# Patient Record
Sex: FEMALE | Race: WHITE | NOT HISPANIC OR LATINO | Employment: FULL TIME | URBAN - METROPOLITAN AREA
[De-identification: names, ages, dates, MRNs, and addresses within clinical notes are randomized per-mention and may not be internally consistent; named-entity substitution may affect disease eponyms.]

---

## 2017-01-16 ENCOUNTER — TELEPHONE (OUTPATIENT)
Dept: DERMATOLOGY | Facility: CLINIC | Age: 27
End: 2017-01-16

## 2017-01-16 NOTE — TELEPHONE ENCOUNTER
This CMA left message for pt to call clinic back @ [phone], to move up derm appt with dr. Crenshaw. Patient was on our waitlist.     Amorrmalia Clemente CMA

## 2017-01-30 ENCOUNTER — DOCUMENTATION ONLY (OUTPATIENT)
Dept: DERMATOLOGY | Facility: CLINIC | Age: 27
End: 2017-01-30

## 2017-01-30 NOTE — PROGRESS NOTES
Writer attempted to contact patient for previsit call to which phone went right to voicemail and mailbox is full so unable to leave a message.    Scarlet Cole LPN

## 2017-03-01 ENCOUNTER — OFFICE VISIT (OUTPATIENT)
Dept: DERMATOLOGY | Facility: CLINIC | Age: 27
End: 2017-03-01
Payer: COMMERCIAL

## 2017-03-01 DIAGNOSIS — L50.9 HIVES: ICD-10-CM

## 2017-03-01 DIAGNOSIS — L30.1 DYSHIDROTIC DERMATITIS: Primary | ICD-10-CM

## 2017-03-01 PROCEDURE — 99201 ZZC OFFICE/OUTPT VISIT, NEW, LEVEL I: CPT | Performed by: DERMATOLOGY

## 2017-03-01 ASSESSMENT — PAIN SCALES - GENERAL: PAINLEVEL: NO PAIN (0)

## 2017-03-01 NOTE — PATIENT INSTRUCTIONS
Apply triamcinolone cream 0.1% twice a day followed immediately with a thick moisturizer like Vanicream. If you do it once before bed, you can put on cotton gloves to go to sleep which will help drive the medicine in better. Please use a moisturizer liberally and minimize contact with irritating things like harsh soaps, antiseptic foam, dish detergent.    For hives: take loratadine 10mg up to 3 times a day for prevention if it occurs. If it doesn't, don't bother. Zyrtec (cetirizine) is metabolised to hydroxyzine in the body so can be sedating for some people.

## 2017-03-01 NOTE — PROGRESS NOTES
[facility] Dermatology Note      Dermatology Problem List:  1. Hives (possibly triggered by flare of dyshidrotic dermatitis_  -s/p Loratadine 10 mg up to 3 times a day, Zyrtec prn   2. Dyshidrotic dermatitis   -s/p Triamcinolone 0.1% cream BID + vanicream liberally + cotton gloves at night.    Encounter Date: Mar 1, 2017    CC:  Chief Complaint   Patient presents with     Derm Problem     rash on wrist and arms          History of Present Illness:  Ms. Ursula De La Cruz is a 26 year old female who presents for evaluation of rash. Pt was seen by Dr. Sharp 11/26/2012 for acne vulgaris. Today, the pt reports rash started in December 2016 and she saw her PCP who started her on oral antibiotics. Rash temporarily resolved but came back. The second time the rash developed, pt saw her PCP who started her on Kenalog 0.1% cream with no improvement to rash. However, pt isn't really that consistent with medication application. Rash is pruritic and appears as tiny blistering bumps on the hands that then extended out to her arms. Pt said the rash is not so bad right now but she has pictures from when it was worse. She also noted hives when the rash was at its worse and showed pictures of the hives. The hives resolved within 24 hours and hasn't returned. It is on her hands and at times legs. Pt states she has tried switched detergents and changed moisturizers with no improvement. Pt denies a personal and family hx of asthma, dry skin rash, but admits to seasonal allergies. Pt currently uses Vanicream as moisturizer. She was on vacation and felt that the rash was stable or perhaps even got worse.    Pt has a history of seasonal allergies and takes zyrtec 10mg daily.    Past Medical History:   Patient Active Problem List   Diagnosis     CARDIOVASCULAR SCREENING; LDL GOAL LESS THAN 160     Adjustment reaction     Acne     KP (keratosis pilaris)     History of compression fracture of spine     ADD (attention  deficit disorder)     Family history of polycystic kidney disease     Goiter     Overweight (BMI 25.0-29.9)     Family history of thyroid disease     Aneurysm of cavernous portion of left internal carotid artery, 2.6mm     Hashimoto's thyroiditis     PVC's (premature ventricular contractions)     Past Medical History   Diagnosis Date     Acne vulgaris      Coronary artery disease      PVCs-asymptomatic. not on any meds for this     Goiter, unspecified 3/17/2015     Hashimoto's thyroiditis 12/21/2016     Past Surgical History   Procedure Laterality Date     Breast surgery  2014     elective augmentation     Cosmetic surgery       breast augmentation     Back surgery  2007     L1 compression fracture, no surgery       Social History:  The patient works as a RN in pediatrics. The patient admits to use of tanning beds. She admits to consumption of 1-2 alcoholic beverages and is a nonsmoker.     Family History:  There is no family history of skin cancer. There is a family hx of Autoimmune disease (Hashimotos) and Hereditary disease (Polycystic kidney disease).     Medications:  Current Outpatient Prescriptions   Medication Sig Dispense Refill     NATURE-THROID 32.5 MG TABS        cetirizine (ZYRTEC) 10 MG tablet Take 10 mg by mouth At Bedtime       triamcinolone (KENALOG) 0.1 % cream Apply sparingly to affected area three times daily until rash resolves. 453.6 g 5     polyethylene glycol (MIRALAX) powder 2-3 servings a day for 3-4 days, then change to 1 serving daily. 510 g 5     levonorgestrel-ethinyl estradiol (LIZ-28) 0.15-30 MG-MCG per tablet Take 1 tablet daily continuously for irregular bleeding 112 tablet 4       No Known Allergies    Review of Systems:  -Skin/Heme New Pt: The patient denies frequent sun exposure. The patient denies excessive scarring or problems healing except as per HPI. The patient denies excessive bleeding.  -Constitutional: The patient is otherwise feeling well.     Physical exam:  Vitals:  There were no vitals taken for this visit.  GEN: This is a well-nourished, well developed female in no acute distress  NEURO: Alert and oriented  PSYCH: in a pleasant mood, appropriate affect  SKIN: Focused examination of the b/l hands, arms was performed.  -Scattered pink eczematous papule on the dorsal hand, dorsal fingers bilaterally   -No dermatographism. No obvious hives.  -No other lesions of concern on areas examined.       Impression/Plan:  1. Hives (as seen in patient's photograph she provided): possibly 2/2 to having a lot of inflammation in the skin or other reasons. No need for treatment if it doesn't return. If it does, can use loratadine 10mg up to 3x a day. Given that pt gets very tired with hydroxyzine, cetirizine probably isn't the best idea as it is converted to hydroxyzine in the body.  2. Dyshidrotic dermatitis of the hands with extension to the arms.     Continue Triamcinolone 0.1% cream BID everyday with thick moisturizer such as Vanicream with cotton gloves at night until follow up. As rash improves, decrease triamcinolone use but continue to moisturize.     Minimize use of foams, wash hands instead if possible, frequent use of moisturizers.      Follow-up in 1 month, earlier for new or changing lesions.       Staff Involved:  Scribe/Staff      Scribe Disclosure:   I, Cathleen Javier, am serving as a scribe to document services personally performed by Dr. Subhash Crenshaw, based on data collection and the provider's statements to me.     Provider Disclosure:   I have reviewed the documentation recorded by the scribe and have edited it as needed. I have personally performed the services documented here and the documentation accurately represents those services and the decisions made by me.     Subhash Crenshaw MD, MS    Department of Dermatology  [facility]: Phone: [phone], Fax:[phone]  [facility]: Phone: [phone], Fax: [phone]

## 2017-03-01 NOTE — MR AVS SNAPSHOT
After Visit Summary   3/1/2017    Ursula De La Cruz    MRN: [id number]           Patient Information     Date Of Birth          1990        Visit Information        Provider Department      3/1/2017 1:30 PM Subhash Crenshaw MD [facility]        Today's Diagnoses     Dyshidrotic dermatitis    -  1    Hives          Care Instructions    Apply triamcinolone cream 0.1% twice a day followed immediately with a thick moisturizer like Vanicream. If you do it once before bed, you can put on cotton gloves to go to sleep which will help drive the medicine in better. Please use a moisturizer liberally and minimize contact with irritating things like harsh soaps, antiseptic foam, dish detergent.    For hives: take loratadine 10mg up to 3 times a day for prevention if it occurs. If it doesn't, don't bother. Zyrtec (cetirizine) is metabolised to hydroxyzine in the body so can be sedating for some people.        Follow-ups after your visit        Your next 10 appointments already scheduled     May 03, 2017  9:45 AM CDT   Return Visit with Subhash Crenshaw MD   [facility]    [address] [place] [id number] [phone]              Who to contact     If you have questions or need follow up information about today's clinic visit or your schedule please contact [facility] directly at [phone].  Normal or non-critical lab and imaging results will be communicated to you by MyChart, letter or phone within 4 business days after the clinic has received the results. If you do not hear from us within 7 days, please contact the clinic through MyChart or phone. If you have a critical or abnormal lab result, we will notify you by phone as soon as possible.  Submit refill requests through [employer] or call your pharmacy and they will forward the refill request to us. Please allow 3 business days for your refill to be  completed.          Additional Information About Your Visit        [employer] Information     [employer] gives you secure access to your electronic health record. If you see a primary care provider, you can also send messages to your care team and make appointments. If you have questions, please call your primary care clinic.  If you do not have a primary care provider, please call [phone] and they will assist you.      [employer] is an electronic gateway that provides easy, online access to your medical records. With [employer], you can request a clinic appointment, read your test results, renew a prescription or communicate with your care team.     To access your existing account, please contact your [facility] or call [phone] for assistance.        Care EveryWhere ID     This is your Care EveryWhere ID. This could be used by other organizations to access your [facility] medical records  [id number]         Blood Pressure from Last 3 Encounters:   11/09/16 110/72   07/18/16 134/88   02/12/16 127/70    Weight from Last 3 Encounters:   12/21/16 156 lb (70.8 kg)   11/09/16 160 lb (72.6 kg)   07/18/16 162 lb 6.4 oz (73.7 kg)              Today, you had the following     No orders found for display         Today's Medication Changes          These changes are accurate as of: 3/1/17  1:51 PM.  If you have any questions, ask your nurse or doctor.               Stop taking these medicines if you haven't already. Please contact your care team if you have questions.     hydrOXYzine 25 MG tablet   Commonly known as:  ATARAX           methylPREDNISolone 4 MG tablet   Commonly known as:  MEDROL DOSEPAK                    Primary Care Provider Office Phone # Fax #    Toni Scott [phone]       [facility] [address]  [facility] [address]        Thank you!     Thank you for choosing [facility]  for your care. Our goal is always to provide  you with excellent care. Hearing back from our patients is one way we can continue to improve our services. Please take a few minutes to complete the written survey that you may receive in the mail after your visit with us. Thank you!             Your Updated Medication List - Protect others around you: Learn how to safely use, store and throw away your medicines at www.disposemymeds.org.          This list is accurate as of: 3/1/17  1:51 PM.  Always use your most recent med list.                   Brand Name Dispense Instructions for use    cetirizine 10 MG tablet    zyrTEC     Take 10 mg by mouth At Bedtime       levonorgestrel-ethinyl estradiol 0.15-30 MG-MCG per tablet    LIZ-28    112 tablet    Take 1 tablet daily continuously for irregular bleeding       NATURE-THROID 32.5 MG Tabs   Generic drug:  thyroid          polyethylene glycol powder    MIRALAX    510 g    2-3 servings a day for 3-4 days, then change to 1 serving daily.       triamcinolone 0.1 % cream    KENALOG    453.6 g    Apply sparingly to affected area three times daily until rash resolves.

## 2017-03-01 NOTE — LETTER
3/1/2017       RE: Ursula De La Cruz  [address] [place] [address]     Dear Colleague,    Thank you for referring your patient, Ursula De La Cruz, to the [facility]. Please see a copy of my visit note below.    [facility] Dermatology Note      Dermatology Problem List:  1. Hives (possibly triggered by flare of dyshidrotic dermatitis_  -s/p Loratadine 10 mg up to 3 times a day, Zyrtec prn   2. Dyshidrotic dermatitis   -s/p Triamcinolone 0.1% cream BID + vanicream liberally + cotton gloves at night.    Encounter Date: Mar 1, 2017    CC:  Chief Complaint   Patient presents with     Derm Problem     rash on wrist and arms          History of Present Illness:  Ms. Ursula De La Cruz is a 26 year old female who presents for evaluation of rash. Pt was seen by Dr. Sharp 11/26/2012 for acne vulgaris. Today, the pt reports rash started in December 2016 and she saw her PCP who started her on oral antibiotics. Rash temporarily resolved but came back. The second time the rash developed, pt saw her PCP who started her on Kenalog 0.1% cream with no improvement to rash. However, pt isn't really that consistent with medication application. Rash is pruritic and appears as tiny blistering bumps on the hands that then extended out to her arms. Pt said the rash is not so bad right now but she has pictures from when it was worse. She also noted hives when the rash was at its worse and showed pictures of the hives. The hives resolved within 24 hours and hasn't returned. It is on her hands and at times legs. Pt states she has tried switched detergents and changed moisturizers with no improvement. Pt denies a personal and family hx of asthma, dry skin rash, but admits to seasonal allergies. Pt currently uses Vanicream as moisturizer. She was on vacation and felt that the rash was stable or perhaps even got worse.    Pt has a history of  seasonal allergies and takes zyrtec 10mg daily.    Past Medical History:   Patient Active Problem List   Diagnosis     CARDIOVASCULAR SCREENING; LDL GOAL LESS THAN 160     Adjustment reaction     Acne     KP (keratosis pilaris)     History of compression fracture of spine     ADD (attention deficit disorder)     Family history of polycystic kidney disease     Goiter     Overweight (BMI 25.0-29.9)     Family history of thyroid disease     Aneurysm of cavernous portion of left internal carotid artery, 2.6mm     Hashimoto's thyroiditis     PVC's (premature ventricular contractions)     Past Medical History   Diagnosis Date     Acne vulgaris      Coronary artery disease      PVCs-asymptomatic. not on any meds for this     Goiter, unspecified 3/17/2015     Hashimoto's thyroiditis 12/21/2016     Past Surgical History   Procedure Laterality Date     Breast surgery  2014     elective augmentation     Cosmetic surgery       breast augmentation     Back surgery  2007     L1 compression fracture, no surgery       Social History:  The patient works as a RN in pediatrics. The patient admits to use of tanning beds. She admits to consumption of 1-2 alcoholic beverages and is a nonsmoker.     Family History:  There is no family history of skin cancer. There is a family hx of Autoimmune disease (Hashimotos) and Hereditary disease (Polycystic kidney disease).     Medications:  Current Outpatient Prescriptions   Medication Sig Dispense Refill     NATURE-THROID 32.5 MG TABS        cetirizine (ZYRTEC) 10 MG tablet Take 10 mg by mouth At Bedtime       triamcinolone (KENALOG) 0.1 % cream Apply sparingly to affected area three times daily until rash resolves. 453.6 g 5     polyethylene glycol (MIRALAX) powder 2-3 servings a day for 3-4 days, then change to 1 serving daily. 510 g 5     levonorgestrel-ethinyl estradiol (LIZ-28) 0.15-30 MG-MCG per tablet Take 1 tablet daily continuously for irregular bleeding 112 tablet 4       No Known  Allergies    Review of Systems:  -Skin/Heme New Pt: The patient denies frequent sun exposure. The patient denies excessive scarring or problems healing except as per HPI. The patient denies excessive bleeding.  -Constitutional: The patient is otherwise feeling well.     Physical exam:  Vitals: There were no vitals taken for this visit.  GEN: This is a well-nourished, well developed female in no acute distress  NEURO: Alert and oriented  PSYCH: in a pleasant mood, appropriate affect  SKIN: Focused examination of the b/l hands, arms was performed.  -Scattered pink eczematous papule on the dorsal hand, dorsal fingers bilaterally   -No dermatographism. No obvious hives.  -No other lesions of concern on areas examined.       Impression/Plan:  1. Hives (as seen in patient's photograph she provided): possibly 2/2 to having a lot of inflammation in the skin or other reasons. No need for treatment if it doesn't return. If it does, can use loratadine 10mg up to 3x a day. Given that pt gets very tired with hydroxyzine, cetirizine probably isn't the best idea as it is converted to hydroxyzine in the body.  2. Dyshidrotic dermatitis of the hands with extension to the arms.     Continue Triamcinolone 0.1% cream BID everyday with thick moisturizer such as Vanicream with cotton gloves at night until follow up. As rash improves, decrease triamcinolone use but continue to moisturize.     Minimize use of foams, wash hands instead if possible, frequent use of moisturizers.      Follow-up in 1 month, earlier for new or changing lesions.       Staff Involved:  Scribe/Staff      Scribe Disclosure:   I, Cathleen Javier, am serving as a scribe to document services personally performed by Dr. Subhash Crenshaw, based on data collection and the provider's statements to me.     Provider Disclosure:   I have reviewed the documentation recorded by the scribe and have edited it as needed. I have personally performed the services documented here and the  documentation accurately represents those services and the decisions made by me.     Subhash Crenshaw MD, MS    Department of Dermatology  [facility]: Phone: [phone], Fax:[phone]  [facility]: Phone: [phone], Fax: [phone]

## 2017-07-18 ENCOUNTER — TELEPHONE (OUTPATIENT)
Dept: CARDIOLOGY | Facility: CLINIC | Age: 27
End: 2017-07-18

## 2017-07-18 DIAGNOSIS — I49.3 PVC'S (PREMATURE VENTRICULAR CONTRACTIONS): Primary | ICD-10-CM

## 2017-07-18 NOTE — TELEPHONE ENCOUNTER
Reason for Call:  Other call back    Detailed comments:  Patient calling. She is to have a echo done yearly. Please place order and call patient when done.     Phone Number Patient can be reached at: Home number on file [phone] (home)    Best Time: any     Can we leave a detailed message on this number? YES    Call taken on 7/18/2017 at 11:52 AM by Maddison Palmer

## 2017-07-18 NOTE — TELEPHONE ENCOUNTER
Called and spoke to Patient, explained to her that In Dr. Candido Narvaez note from 7/2016 it does not state that he wants her to have a repeat echo, just yearly follow up.  Patient states that she was told by Dr. Candido Narvaez that she needed yearly echos as well.    Patient does have follow up appointment scheduled at 10/16/2017at 1:40.  She is requesting to have her echo done the same day before she sees Dr. Candido Narvaez.    Message sent to provider to advise if he wants another echo ordered or not for her yearly follow up appointment.    Roma Crabtree CMA.

## 2017-07-31 NOTE — TELEPHONE ENCOUNTER
Per Dr. Narvaez, patient can have an echo prior to visit.  Echo ordered.  Please call patient to schedule.  Juliet Navarrete RN

## 2017-08-01 NOTE — TELEPHONE ENCOUNTER
Called and spoke to Patient, let her know echo order has been placed. Patient states she will call imaging dept to make appointment for echo prior to OV 10/16/17, confirmed she has the phone number [phone].    Roma Crabtree CMA.

## 2017-09-13 DIAGNOSIS — Z36.0 SCREENING FOR CHROMOSOMAL ANOMALIES BY AMNIOCENTESIS: Primary | ICD-10-CM

## 2017-09-13 PROCEDURE — 86762 RUBELLA ANTIBODY: CPT | Performed by: OBSTETRICS & GYNECOLOGY

## 2017-09-13 PROCEDURE — 86747 PARVOVIRUS ANTIBODY: CPT | Mod: 90 | Performed by: OBSTETRICS & GYNECOLOGY

## 2017-09-13 PROCEDURE — 86644 CMV ANTIBODY: CPT | Performed by: OBSTETRICS & GYNECOLOGY

## 2017-09-13 PROCEDURE — 99000 SPECIMEN HANDLING OFFICE-LAB: CPT | Performed by: OBSTETRICS & GYNECOLOGY

## 2017-09-13 PROCEDURE — 36415 COLL VENOUS BLD VENIPUNCTURE: CPT | Performed by: OBSTETRICS & GYNECOLOGY

## 2017-09-14 LAB
B19V IGG SER IA-ACNC: 0.3 IV
B19V IGM SER IA-ACNC: 0.15 IV
CMV IGG SERPL QL IA: 0.3 AI (ref 0–0.8)
RUBV IGG SERPL IA-ACNC: 5 IU/ML

## 2017-09-27 ENCOUNTER — RADIANT APPOINTMENT (OUTPATIENT)
Dept: CARDIOLOGY | Facility: CLINIC | Age: 27
End: 2017-09-27
Attending: INTERNAL MEDICINE
Payer: COMMERCIAL

## 2017-09-27 DIAGNOSIS — I49.3 PVC'S (PREMATURE VENTRICULAR CONTRACTIONS): ICD-10-CM

## 2017-09-27 PROCEDURE — 93306 TTE W/DOPPLER COMPLETE: CPT

## 2017-10-09 ENCOUNTER — PRE VISIT (OUTPATIENT)
Dept: CARDIOLOGY | Facility: CLINIC | Age: 27
End: 2017-10-09

## 2017-10-16 ENCOUNTER — OFFICE VISIT (OUTPATIENT)
Dept: CARDIOLOGY | Facility: CLINIC | Age: 27
End: 2017-10-16
Payer: COMMERCIAL

## 2017-10-16 VITALS — DIASTOLIC BLOOD PRESSURE: 84 MMHG | OXYGEN SATURATION: 100 % | SYSTOLIC BLOOD PRESSURE: 131 MMHG | HEART RATE: 77 BPM

## 2017-10-16 DIAGNOSIS — I49.3 PVC'S (PREMATURE VENTRICULAR CONTRACTIONS): Primary | ICD-10-CM

## 2017-10-16 PROCEDURE — 99213 OFFICE O/P EST LOW 20 MIN: CPT | Performed by: INTERNAL MEDICINE

## 2017-10-16 ASSESSMENT — PAIN SCALES - GENERAL: PAINLEVEL: NO PAIN (0)

## 2017-10-16 NOTE — TELEPHONE ENCOUNTER
15 month follow up with Dr. Candido Narvaez for PVCs that correlated with symptoms of palpitations. ECHO done 9/27/2017.

## 2017-10-16 NOTE — MR AVS SNAPSHOT
After Visit Summary   10/16/2017    Ursula De La Cruz    MRN: [id number]           Patient Information     Date Of Birth          1990        Visit Information        Provider Department      10/16/2017 1:40 PM Candido Narvaez MD [facility]        Today's Diagnoses     PVC's (premature ventricular contractions)    -  1      Care Instructions    1.          [facility] Cardiology [facility] Location    If you have any questions regarding to your visit please contact your care team:     Cardiology  Telephone Number   JulietAbraham Diaz  Cardiology RN's.    Kathy Crabtree CMA [phone]    After hours: [phone].  [phone]   For scheduling appts:     [phone] or  [phone]    After hours: [phone]   For the Device Clinic (Pacemakers and ICD's)  RN's :  Ashely Garces   During business hours: [phone]  After business hours:  [phone]- select option 4.      If you need a medication refill please contact your pharmacy.  Please allow 3 business days for your refill to be completed.    As always, Thank you for trusting us with your health care needs!  _____________________________________________________________________              Follow-ups after your visit        Who to contact     If you have questions or need follow up information about today's clinic visit or your schedule please contact [facility] directly at [phone].  Normal or non-critical lab and imaging results will be communicated to you by MyChart, letter or phone within 4 business days after the clinic has received the results. If you do not hear from us within 7 days, please contact the clinic through MyChart or phone. If you have a critical or abnormal lab result, we will notify you by phone as soon as possible.  Submit refill requests through MyChart or call your pharmacy and they will forward the  refill request to us. Please allow 3 business days for your refill to be completed.          Additional Information About Your Visit        [employer] Information     [employer] gives you secure access to your electronic health record. If you see a primary care provider, you can also send messages to your care team and make appointments. If you have questions, please call your primary care clinic.  If you do not have a primary care provider, please call [phone] and they will assist you.        Care EveryWhere ID     This is your Care EveryWhere ID. This could be used by other organizations to access your [place] medical records  [id number]        Your Vitals Were     Pulse Pulse Oximetry                77 100%           Blood Pressure from Last 3 Encounters:   10/16/17 131/84   11/09/16 110/72   07/18/16 134/88    Weight from Last 3 Encounters:   12/21/16 70.8 kg (156 lb)   11/09/16 72.6 kg (160 lb)   07/18/16 73.7 kg (162 lb 6.4 oz)              Today, you had the following     No orders found for display       Primary Care Provider Office Phone # Fax #    Toni Scott [phone]       [address]  [facility] [address]        Equal Access to Services     KAREL BUCHANAN : Hadii aad ku hadasho Soomaali, waaxda luqadaha, qaybta kaalmada adeegyada, maria e calixtoin haysebastiann joycelyn lock. So [facility] [phone].    ATENCIÓN: Si habla español, tiene a cee disposición servicios gratuitos de asistencia lingüística. Llame al [phone].    We comply with applicable federal civil rights laws and [place] laws. We do not discriminate on the basis of race, color, national origin, age, disability, sex, sexual orientation, or gender identity.            Thank you!     Thank you for choosing [facility] AT [facility]  for your care. Our goal is always to provide you with excellent care. Hearing back from our patients is one way we can continue to improve our  services. Please take a few minutes to complete the written survey that you may receive in the mail after your visit with us. Thank you!             Your Updated Medication List - Protect others around you: Learn how to safely use, store and throw away your medicines at www.disposemymeds.org.          This list is accurate as of: 10/16/17  2:30 PM.  Always use your most recent med list.                   Brand Name Dispense Instructions for use Diagnosis    NATURE-THROID 32.5 MG Tabs   Generic drug:  thyroid           polyethylene glycol powder    MIRALAX    510 g    2-3 servings a day for 3-4 days, then change to 1 serving daily.    Chronic constipation       triamcinolone 0.1 % cream    KENALOG    453.6 g    Apply sparingly to affected area three times daily until rash resolves.    Rash and nonspecific skin eruption

## 2017-10-16 NOTE — PROGRESS NOTES
"HPI:   Ursula De La Cruz returns for follow-up. I last saw her in July 2016 when she was Ursula Desir. She is a 27-year-old woman with previously documented PVCs that correlated with symptoms of palpitations. She had no evidence of structural heart disease in 2014. This included cardiac MRI. Holter at that time showed a 10% PVC burden. Although we do not have PVCs in all 12 leads, the available PVCs are suggestive of an origin in the right ventricular outflow tract. We repeated an echocardiogram that showed that her PVC burden did not cause any deterioration in her LV function. She elected not to begin any therapy.  At this time she reports feeling well. She continues to feel palpitations but is not bothered by them. She reports noticing them \"a couple times per week\" but she has noticed some at least twice today. She denies chest pain, shortness of breath, lightheadedness or fatigue. She continues on no medications. She and her  are thinking about trying to become pregnant in the near future.    PAST MEDICAL HISTORY:  Past Medical History:   Diagnosis Date     Acne vulgaris      Coronary artery disease     PVCs-asymptomatic. not on any meds for this     Goiter, unspecified 3/17/2015     Hashimoto's thyroiditis 12/21/2016       CURRENT MEDICATIONS:  Current Outpatient Prescriptions   Medication Sig Dispense Refill     NATURE-THROID 32.5 MG TABS        cetirizine (ZYRTEC) 10 MG tablet Take 10 mg by mouth At Bedtime       triamcinolone (KENALOG) 0.1 % cream Apply sparingly to affected area three times daily until rash resolves. 453.6 g 5     polyethylene glycol (MIRALAX) powder 2-3 servings a day for 3-4 days, then change to 1 serving daily. 510 g 5     levonorgestrel-ethinyl estradiol (LIZ-28) 0.15-30 MG-MCG per tablet Take 1 tablet daily continuously for irregular bleeding 112 tablet 4       PAST SURGICAL HISTORY:  Past Surgical History:   Procedure Laterality Date     BACK SURGERY  2007    L1 compression " fracture, no surgery     BREAST SURGERY  2014    elective augmentation     COSMETIC SURGERY      breast augmentation       ALLERGIES:   No Known Allergies    SOCIAL HISTORY:  Social History   Substance Use Topics     Smoking status: Never Smoker     Smokeless tobacco: Never Used     Alcohol use Yes      Comment: social use only       ROS:   Constitutional: No fever, chills, or sweats. Weight stable.   ENT: No visual disturbance, ear ache, epistaxis, sore throat.   Cardiovascular: As per HPI.   Respiratory: No cough, hemoptysis.    GI: No nausea, vomiting, hematemesis, melena, or hematochezia.   : No hematuria.   Integument: Negative.   Psychiatric: Negative.   Hematologic:  Easy bruising, no easy bleeding.  Neuro: Negative.   Endocrinology: No significant heat or cold intolerance   Musculoskeletal: No myalgia.    Exam:  /84 (BP Location: Left arm, Patient Position: Chair, Cuff Size: Adult Regular)  Pulse 77  SpO2 100%  No acute distress.  Alert and oriented.  HEENT:  Normocephalic, atraumatic, sclera non-icteric, EOM intact, mucosa moist  Neck: No lymphadenopathy.  JVP 6 cm without HJR.  Cor: RRR with prematures. Normal S1 and S2.  No murmur, rub, or gallop.  PMI in Lf 5th ICS.  Lungs:  Clear  Abd: Soft, nontender, bowel sounds present.  No hepatosplenomegaly..  Extremities: No C/C.  No edema      Labs:  CBC RESULTS:   Lab Results   Component Value Date    WBC 7.2 02/12/2016    RBC 4.36 02/12/2016    HGB 13.6 02/12/2016    HCT 39.6 02/12/2016    MCV 91 02/12/2016    MCH 31.2 02/12/2016    MCHC 34.3 02/12/2016    RDW 11.9 02/12/2016     02/12/2016       BMP RESULTS:  Lab Results   Component Value Date     03/17/2015    POTASSIUM 3.6 03/17/2015    CHLORIDE 105 03/17/2015    CO2 28 03/17/2015    ANIONGAP 4 03/17/2015    GLC 80 03/17/2015    BUN 15 03/17/2015    CR 0.74 03/17/2015    GFRESTIMATED >90  Non  GFR Calc   03/17/2015    GFRESTBLACK >90   GFR Calc    03/17/2015    HELADIO 8.5 03/17/2015        INR RESULTS:  No results found for: INR    Procedures:      Assessment and Plan:   Ursula De La Cruz is a 27-year-old woman with previously documented PVCs that correlated with symptoms of palpitations. She had no evidence of structural heart disease by cardiac MRI in 2014 and on recent echo.  She has had about a 10% burden of PVCs in the past. Her symptoms are sufficiently mild amount she prefers not to begin medical therapy. Unless she has problems sooner, she will return for follow-up in one year. We will repeat an echo at that time and we will repeat a Holter if there is any change in her perception of her PVCs. If she becomes pregnant in the next year I would like to see her back earlier, such as during her second trimester. It was a pleasure seeing Ursula De La Cruz today.      CC  Patient Care Team:  Toni Scott MD as PCP - General (Family Practice)  Francesca Chong, RN as Clinic Care Coordinator (Neurosurgery)  Devi Avendano, RN as Nurse Coordinator (Neurology)

## 2017-10-16 NOTE — LETTER
"10/16/2017      RE: Ursula De La Cruz  [address] [facility] [address]       Dear Colleague,    Thank you for the opportunity to participate in the care of your patient, Ursula De La Cruz, at the [facility] HEART AT [facility]. Please see a copy of my visit note below.    HPI:   Ursula De La Cruz returns for follow-up. I last saw her in July 2016 when she was Ursula Desir. She is a 27-year-old woman with previously documented PVCs that correlated with symptoms of palpitations. She had no evidence of structural heart disease in 2014. This included cardiac MRI. Holter at that time showed a 10% PVC burden. Although we do not have PVCs in all 12 leads, the available PVCs are suggestive of an origin in the right ventricular outflow tract. We repeated an echocardiogram that showed that her PVC burden did not cause any deterioration in her LV function. She elected not to begin any therapy.  At this time she reports feeling well. She continues to feel palpitations but is not bothered by them. She reports noticing them \"a couple times per week\" but she has noticed some at least twice today. She denies chest pain, shortness of breath, lightheadedness or fatigue. She continues on no medications. She and her  are thinking about trying to become pregnant in the near future.    PAST MEDICAL HISTORY:  Past Medical History:   Diagnosis Date     Acne vulgaris      Coronary artery disease     PVCs-asymptomatic. not on any meds for this     Goiter, unspecified 3/17/2015     Hashimoto's thyroiditis 12/21/2016       CURRENT MEDICATIONS:  Current Outpatient Prescriptions   Medication Sig Dispense Refill     NATURE-THROID 32.5 MG TABS        cetirizine (ZYRTEC) 10 MG tablet Take 10 mg by mouth At Bedtime       triamcinolone (KENALOG) 0.1 % cream Apply sparingly to affected area three times daily until rash resolves. 453.6 g 5     polyethylene glycol " (MIRALAX) powder 2-3 servings a day for 3-4 days, then change to 1 serving daily. 510 g 5     levonorgestrel-ethinyl estradiol (LIZ-28) 0.15-30 MG-MCG per tablet Take 1 tablet daily continuously for irregular bleeding 112 tablet 4       PAST SURGICAL HISTORY:  Past Surgical History:   Procedure Laterality Date     BACK SURGERY  2007    L1 compression fracture, no surgery     BREAST SURGERY  2014    elective augmentation     COSMETIC SURGERY      breast augmentation       ALLERGIES:   No Known Allergies    SOCIAL HISTORY:  Social History   Substance Use Topics     Smoking status: Never Smoker     Smokeless tobacco: Never Used     Alcohol use Yes      Comment: social use only       ROS:   Constitutional: No fever, chills, or sweats. Weight stable.   ENT: No visual disturbance, ear ache, epistaxis, sore throat.   Cardiovascular: As per HPI.   Respiratory: No cough, hemoptysis.    GI: No nausea, vomiting, hematemesis, melena, or hematochezia.   : No hematuria.   Integument: Negative.   Psychiatric: Negative.   Hematologic:  Easy bruising, no easy bleeding.  Neuro: Negative.   Endocrinology: No significant heat or cold intolerance   Musculoskeletal: No myalgia.    Exam:  /84 (BP Location: Left arm, Patient Position: Chair, Cuff Size: Adult Regular)  Pulse 77  SpO2 100%  No acute distress.  Alert and oriented.  HEENT:  Normocephalic, atraumatic, sclera non-icteric, EOM intact, mucosa moist  Neck: No lymphadenopathy.  JVP 6 cm without HJR.  Cor: RRR with prematures. Normal S1 and S2.  No murmur, rub, or gallop.  PMI in Lf 5th ICS.  Lungs:  Clear  Abd: Soft, nontender, bowel sounds present.  No hepatosplenomegaly..  Extremities: No C/C.  No edema      Labs:  CBC RESULTS:   Lab Results   Component Value Date    WBC 7.2 02/12/2016    RBC 4.36 02/12/2016    HGB 13.6 02/12/2016    HCT 39.6 02/12/2016    MCV 91 02/12/2016    MCH 31.2 02/12/2016    MCHC 34.3 02/12/2016    RDW 11.9 02/12/2016     02/12/2016        BMP RESULTS:  Lab Results   Component Value Date     03/17/2015    POTASSIUM 3.6 03/17/2015    CHLORIDE 105 03/17/2015    CO2 28 03/17/2015    ANIONGAP 4 03/17/2015    GLC 80 03/17/2015    BUN 15 03/17/2015    CR 0.74 03/17/2015    GFRESTIMATED >90  Non  GFR Calc   03/17/2015    GFRESTBLACK >90   GFR Calc   03/17/2015    HELADIO 8.5 03/17/2015        INR RESULTS:  No results found for: INR    Procedures:      Assessment and Plan:   Ursula De La Cruz is a 27-year-old woman with previously documented PVCs that correlated with symptoms of palpitations. She had no evidence of structural heart disease by cardiac MRI in 2014 and on recent echo.  She has had about a 10% burden of PVCs in the past. Her symptoms are sufficiently mild amount she prefers not to begin medical therapy. Unless she has problems sooner, she will return for follow-up in one year. We will repeat an echo at that time and we will repeat a Holter if there is any change in her perception of her PVCs. If she becomes pregnant in the next year I would like to see her back earlier, such as during her second trimester. It was a pleasure seeing Ursula De La Cruz today.    Candido Narvaez MD    CC  Patient Care Team:  Toni Scott MD as PCP - General (Family Practice)  Francesca Chong, RN as Clinic Care Coordinator (Neurosurgery)  Devi Avendano, RN as Nurse Coordinator (Neurology)

## 2017-10-16 NOTE — NURSING NOTE
"Chief Complaint   Patient presents with     RECHECK     15 month follow up with Dr. Candido Narvaez for PVCs that correlated with symptoms of palpitations. ECHO done 9/27/2017. States she is still having palpitations that come and go. No other sx.       Initial /84 (BP Location: Left arm, Patient Position: Chair, Cuff Size: Adult Regular)  Pulse 77  SpO2 100% Estimated body mass index is 25.18 kg/(m^2) as calculated from the following:    Height as of 12/21/16: 5' 6\" (1.676 m).    Weight as of 12/21/16: 156 lb (70.8 kg)..  BP completed using cuff size: regular    Roma Crabtree CMA    "

## 2017-10-16 NOTE — NURSING NOTE
Cardiac Testing: Patient given instructions regarding  echocardiogram  in 1 year. Discussed purpose, preparation, procedure and when to expect results reported back to the patient. Patient demonstrated understanding of this information and agreed to call with further questions or concerns.  Med Reconcile: Reviewed and verified all current medications with the patient. The updated medication list was printed and given to the patient.  Return Appointment: Patient given instructions regarding scheduling next clinic visit. Patient demonstrated understanding of this information and agreed to call with further questions or concerns.  1 year with an echo prior  If patient does become pregnant prior to her 1 year follow up, it was advised for her to see Dr. Narvaez in her 2nd trimester.  Patient stated she understood all health information given and agreed to call with further questions or concerns.  Juliet Navarrete, RN  Care Coordinator  [facility] [place] [facility]  [phone]

## 2017-10-16 NOTE — PATIENT INSTRUCTIONS
1.  Dr. Candido Narvaez would like you to return for a cardiac follow up in 1 year with an echo prior (October 2018).  We will contact you regarding your appointment when the time draws closer or you may call [phone] to arrange an appointment.  Mean while, if you should have any questions or concerns regarding your heart health, please contact us.  Thank you for choosing [facility] for your care.    2.  If pregancy does occur, please see Dr. Narvaez while in your 2nd trimester.        [facility] Cardiology - [place] Location    If you have any questions regarding to your visit please contact your care team:     Cardiology  Telephone Number   Juliet Navarrete,  Abraham Lazaro  Cardiology RN's.    Kathy Crabtree [facility] [phone]    After hours: [phone].  [phone]   For scheduling appts:     [phone] or  [phone]    After hours: [phone]   For the Device Clinic (Pacemakers and ICD's)  RN's :  Ashely Garces   During business hours: [phone]  After business hours:  [phone]- select option 4.      If you need a medication refill please contact your pharmacy.  Please allow 3 business days for your refill to be completed.    As always, Thank you for trusting us with your health care needs!  _____________________________________________________________________

## 2017-12-12 ENCOUNTER — MEDICAL CORRESPONDENCE (OUTPATIENT)
Dept: HEALTH INFORMATION MANAGEMENT | Facility: CLINIC | Age: 27
End: 2017-12-12

## 2017-12-22 DIAGNOSIS — R94.6 NONSPECIFIC ABNORMAL RESULTS OF THYROID FUNCTION STUDY: Primary | ICD-10-CM

## 2018-03-23 ENCOUNTER — RADIANT APPOINTMENT (OUTPATIENT)
Dept: CARDIOLOGY | Facility: CLINIC | Age: 28
End: 2018-03-23
Attending: INTERNAL MEDICINE
Payer: COMMERCIAL

## 2018-03-23 DIAGNOSIS — I49.3 PVC'S (PREMATURE VENTRICULAR CONTRACTIONS): ICD-10-CM

## 2018-03-23 PROCEDURE — 93306 TTE W/DOPPLER COMPLETE: CPT

## 2018-04-02 ENCOUNTER — OFFICE VISIT (OUTPATIENT)
Dept: CARDIOLOGY | Facility: CLINIC | Age: 28
End: 2018-04-02
Payer: COMMERCIAL

## 2018-04-02 VITALS
SYSTOLIC BLOOD PRESSURE: 110 MMHG | HEART RATE: 73 BPM | DIASTOLIC BLOOD PRESSURE: 71 MMHG | WEIGHT: 178 LBS | OXYGEN SATURATION: 100 % | BODY MASS INDEX: 28.73 KG/M2

## 2018-04-02 DIAGNOSIS — I49.3 PVC'S (PREMATURE VENTRICULAR CONTRACTIONS): Primary | ICD-10-CM

## 2018-04-02 DIAGNOSIS — Z33.1 PREGNANCY, INCIDENTAL: ICD-10-CM

## 2018-04-02 PROCEDURE — 99213 OFFICE O/P EST LOW 20 MIN: CPT | Performed by: INTERNAL MEDICINE

## 2018-04-02 RX ORDER — PRENATAL VIT/IRON FUM/FOLIC AC 27MG-0.8MG
1 TABLET ORAL DAILY
COMMUNITY
End: 2023-01-09

## 2018-04-02 NOTE — NURSING NOTE
"Chief Complaint   Patient presents with     RECHECK     follow up with Dr. Candido Narvaez for PVCs and pregnancy (patient currently in her 2nd trimester of pregnancy). Pt reports having palpitations.       Initial /71 (BP Location: Right arm, Patient Position: Chair, Cuff Size: Adult Regular)  Pulse 73  Wt 80.7 kg (178 lb)  SpO2 100%  BMI 28.73 kg/m2 Estimated body mass index is 28.73 kg/(m^2) as calculated from the following:    Height as of 12/21/16: 1.676 m (5' 6\").    Weight as of this encounter: 80.7 kg (178 lb)..  BP completed using cuff size: regular    Sherrill Meredith MA    "

## 2018-04-02 NOTE — PATIENT INSTRUCTIONS
Thank you for coming to the [facility] @ [place]; please note the following instructions:    1.         If you have any questions regarding your visit please contact your care team:     Cardiology  Telephone Number   Juliet GONZALEZ, RN  Abraham CARTWRIGHT, RN   Kimber HOWARD, VERONICA BO, TALITA HENRIQUEZ MA   [phone]    *After hours: [phone]   For scheduling appts:     [phone] or    [phone] (select option 1)    *After hours: [phone]     For the Device Clinic (Pacemakers and ICD's)  RN's :  Ashely Garces   During business hours: [phone]    *After business hours:  [phone] (select option 4)      Normal test result notifications will be released via [employer] or mailed within 7 business days.  All other test results, will be communicated via telephone once reviewed by your cardiologist.    If you need a medication refill please contact your pharmacy.  Please allow 3 business days for your refill to be completed.    As always, thank you for trusting us with your health care needs!

## 2018-04-02 NOTE — PROGRESS NOTES
HPI:  Ursula De La Cruz returns for follow-up of. We last saw her in October 2017. (I have also seen her as Ursulaiwona Desir.) She is a 27-year-old woman with previously documented PVCs that correlated with symptoms of palpitations. She had no evidence of structural heart disease by cardiac MRI in 2014 and on recent echo. She has had about a 10% burden of PVCs in the past. Her symptoms are sufficiently mild amount she prefers not to begin medical therapy.   Our plan was to see her back in one year unless she became pregnant in which case we would see her back during her second trimester. She is now 24 weeks pregnant and doing very well. She denies chest pain, shortness of breath, lightheadedness or fatigue. She notices minimal edema at this point. She continues to have palpitations but she reports they are no worse than previously.    She had an echo one week ago that showed normal left ventricular size and function. Ejection fraction 55-60%. The right ventricle was normal. There is no significant valve abnormalities. There was mild left atrial enlargement. Pulmonary artery pressures were normal. There is no change since her echo in 2017.      PAST MEDICAL HISTORY:  Past Medical History:   Diagnosis Date     Acne vulgaris      Coronary artery disease     PVCs-asymptomatic. not on any meds for this     Goiter, unspecified 3/17/2015     Hashimoto's thyroiditis 12/21/2016       CURRENT MEDICATIONS:  Current Outpatient Prescriptions   Medication Sig Dispense Refill     magnesium citrate solution Take 296 mLs by mouth once       Prenatal Vit-Fe Fumarate-FA (PRENATAL MULTIVITAMIN PLUS IRON) 27-0.8 MG TABS per tablet Take 1 tablet by mouth daily       NATURE-THROID 32.5 MG TABS        polyethylene glycol (MIRALAX) powder 2-3 servings a day for 3-4 days, then change to 1 serving daily. 510 g 5     triamcinolone (KENALOG) 0.1 % cream Apply sparingly to affected area three times daily until rash resolves. (Patient not taking:  Reported on 4/2/2018) 453.6 g 5       PAST SURGICAL HISTORY:  Past Surgical History:   Procedure Laterality Date     BACK SURGERY  2007    L1 compression fracture, no surgery     BREAST SURGERY  2014    elective augmentation     COSMETIC SURGERY      breast augmentation       ALLERGIES:   No Known Allergies    FAMILY HISTORY:  Family History   Problem Relation Age of Onset     Alcohol/Drug Father      Depression Father      Alcohol/Drug Maternal Grandmother      KIDNEY DISEASE Maternal Grandmother      Depression Maternal Grandmother      Lipids Maternal Grandmother      Endocrine Disease Maternal Grandmother      HEART DISEASE Maternal Grandmother      Coronary Artery Disease Maternal Grandmother      Hypertension Maternal Grandmother      CEREBROVASCULAR DISEASE Maternal Grandmother      Thyroid Disease Maternal Grandmother      Known Genetic Syndrome Maternal Grandmother      polycystic kidney disease     Alcohol/Drug Maternal Grandfather      Depression Maternal Grandfather      DIABETES Maternal Grandfather      Hyperlipidemia Maternal Grandfather      Alcohol/Drug Paternal Grandfather      Depression Paternal Grandfather      Depression Paternal Grandmother      KIDNEY DISEASE Mother      Endocrine Disease Mother      Thyroid Disease Mother      Thyroid Disease Mother      hypothyroidism     Known Genetic Syndrome Mother      polycystic kidney disease     CANCER No family hx of      Glaucoma No family hx of      Macular Degeneration No family hx of        SOCIAL HISTORY:  Social History   Substance Use Topics     Smoking status: Never Smoker     Smokeless tobacco: Never Used     Alcohol use Yes      Comment: social use only       ROS:   Constitutional: No fever, chills, or sweats. Weight stable.   ENT: No visual disturbance, ear ache, epistaxis, sore throat.   Cardiovascular: As per HPI.   Respiratory: No cough, hemoptysis.    GI: No nausea, vomiting, hematemesis, melena, or hematochezia.   : No hematuria.    Integument: Negative.   Psychiatric: Negative.   Hematologic:  Easy bruising, no easy bleeding.  Neuro: Negative.   Endocrinology: No significant heat or cold intolerance   Musculoskeletal: No myalgia.    Exam:  /71 (BP Location: Right arm, Patient Position: Chair, Cuff Size: Adult Regular)  Pulse 73  Wt 80.7 kg (178 lb)  SpO2 100%  BMI 28.73 kg/m2  No acute distress.  Alert and oriented.  HEENT:  Normocephalic, atraumatic, sclera non-icteric, EOM intact, mucosa moist  Neck: No lymphadenopathy.  JVP 6 cm without HJR.  Cor: RRR. Normal S1 and S2.  Gr 1/6 early KRISTI at base.  No rub, or gallop.  PMI in Lf 5th ICS.  Lungs:  Clear  Abd: Gravid, nontender, bowel sounds present.  No hepatosplenomegaly..  Extremities: No C/C.  Trace edema.      Labs:  CBC RESULTS:   Lab Results   Component Value Date    WBC 7.2 2016    RBC 4.36 2016    HGB 13.6 2016    HCT 39.6 2016    MCV 91 2016    MCH 31.2 2016    MCHC 34.3 2016    RDW 11.9 2016     2016       BMP RESULTS:  Lab Results   Component Value Date     2015    POTASSIUM 3.6 2015    CHLORIDE 105 2015    CO2 28 2015    ANIONGAP 4 2015    GLC 80 2015    BUN 15 2015    CR 0.74 2015    GFRESTIMATED >90  Non  GFR Calc   2015    GFRESTBLACK >90   GFR Calc   2015    HELADIO 8.5 2015        INR RESULTS:  No results found for: INR    Procedures:  Echocardiogram:   Recent Results (from the past 8760 hour(s))   Echocardiogram Complete    Narrative    [id number]  [facility]19  [id number]  [id number]^NICOLETTE^YAKELIN^           [facility]  Echocardiography Laboratory  [address]  [place] [address]        Name: REYNA ARAUJO  MRN: [id number]  : 1990  Study Date: 2018 11:38 AM  Age: 27 yrs  Gender: Female  Patient Location: [facility]  Reason For Study: , PVC's (premature ventricular  contractions). 23 weeks  pregnant  Ordering Physician: YAKELIN WILL  Referring Physician: YAKELIN WILL  Performed By: Maria Luisa Alonso RDCS     BSA: 1.8 m2  Height: 66 in  Weight: 156 lb  HR: 74  BP: 105/51 mmHg  _____________________________________________________________________________  __     _____________________________________________________________________________  __        Interpretation Summary     Global and regional left ventricular function is normal with an EF of 55-60%.  Left ventricular diastolic function is normal.  The right ventricle is normal size. Global right ventricular function is  normal.  No significant valvular dysfunction.  Pulmonary artery systolic pressure is normal.  Mild left atrial enlargement.     This study was compared with the study from 9/27/17 .  No significant change outside of mild increase in left atrial size  _____________________________________________________________________________  __        Left Ventricle  Left ventricular wall thickness is normal. Left ventricular size is normal.  Global and regional left ventricular function is normal with an EF of 55-60%.  Left ventricular diastolic function is normal.     Right Ventricle  The right ventricle is normal size. Global right ventricular function is  normal.     Atria  Mild left atrial enlargement is present. Mild right atrial enlargement is  present. The atrial septum is intact as assessed by color Doppler .     Mitral Valve  The mitral valve is normal. Trace to mild mitral insufficiency is present.        Aortic Valve  Aortic valve is normal in structure and function. The aortic valve is  tricuspid. No aortic regurgitation is present.     Tricuspid Valve  Trace tricuspid insufficiency is present. The right ventricular systolic  pressure is approximated at 25.2 mmHg plus the right atrial pressure.  Pulmonary artery systolic pressure is normal.     Pulmonic Valve  The pulmonic valve is normal. Trace pulmonic  insufficiency is present.     Vessels  The aorta root is normal. The inferior vena cava was normal in size with  preserved respiratory variability. Estimated mean right atrial pressure is 3  mmHg.     Pericardium  No pericardial effusion is present.        Compared to Previous Study  This study was compared with the study from 17 . No significant change  outside of mild increase in left atrial size.     Attestation  I have personally viewed the imaging and agree with the interpretation and  report as documented by the fellow, Mor Rhoades, and/or edited by me.  _____________________________________________________________________________  __     MMode/2D Measurements & Calculations  IVSd: 0.65 cm  LVIDd: 5.2 cm  LVIDs: 2.8 cm  LVPWd: 0.52 cm  FS: 45.8 %  LV mass(C)d: 99.5 grams  LV mass(C)dI: 55.3 grams/m2  Ao root diam: 2.8 cm  LA dimension: 4.2 cm  asc Aorta Diam: 2.5 cm  LA/Ao: 1.5  LVOT diam: 2.2 cm  LVOT area: 3.9 cm2  LA Volume (BP): 72.0 ml     LA Volume Index (BP): 40.0 ml/m2  RWT: 0.20        Doppler Measurements & Calculations  MV E max danial: 95.8 cm/sec  MV A max danial: 66.1 cm/sec  MV E/A: 1.4  MV dec time: 0.15 sec  Ao V2 max: 148.2 cm/sec  Ao max P.0 mmHg  DWAYNE(V,D): 3.5 cm2  LV V1 max P.1 mmHg  LV V1 max: 133.3 cm/sec  LV V1 VTI: 28.8 cm  CO(LVOT): 6.8 l/min  CI(LVOT): 3.8 l/min/m2  SV(LVOT): 113.5 ml  SI(LVOT): 63.1 ml/m2  PA V2 max: 107.0 cm/sec  PA max P.6 mmHg  TR max danial: 251.1 cm/sec  TR max P.2 mmHg  Pulm Sys Danial: 62.9 cm/sec  Pulm Santillan Danial: 60.9 cm/sec  Pulm S/D: 1.0  AV Danial Ratio (DI): 0.90  E/E' av.5  Lateral E/e': 6.1  Medial E/e': 6.9              _____________________________________________________________________________  __           Report approved by: Richard Gomez 2018 02:35 PM      Echocardiogram Complete    Narrative    [id number]  [id number]  [id number]  [id number]^NICOLETTE^YAKELIN^           [facility]  Echocardiography  Laboratory  [address] [place] [address]        Name: REYNA ARAUJO  MRN: [id number]  : 1990  Study Date: 2017 11:30 AM  Age: 27 yrs  Gender: Female  Patient [facility]: [facility]  Reason For Study: Ventricular premature depolarization  Ordering Physician: YAKELIN WILL  Referring Physician: YAKELIN WILL  Performed By: Selina Rico RDCS     BSA: 1.8 m2  Height: 66 in  Weight: 160 lb  BP: 114/57 mmHg  _____________________________________________________________________________  __        Procedure  Echocardiogram with two-dimensional, color and spectral Doppler performed.  _____________________________________________________________________________  __        Interpretation Summary     Global and regional left ventricular function is normal with an EF of 55-60%.  Right ventricular function, chamber size, wall motion, and thickness are  normal.  No pericardial effusion is present.  There has been no change.  _____________________________________________________________________________  __        Left Ventricle  Global and regional left ventricular function is normal with an EF of 55-60%.  Left ventricular wall thickness is normal. Left ventricular size is normal.  Normal left ventricular filling for age. No regional wall motion abnormalities  are seen.     Right Ventricle  Right ventricular function, chamber size, wall motion, and thickness are  normal.     Atria  Both atria appear normal. The atrial septum is intact as assessed by color  Doppler .     Mitral Valve  The mitral valve is normal. Trace to mild mitral insufficiency is present.        Aortic Valve  Aortic valve is normal in structure and function.     Tricuspid Valve  The tricuspid valve is normal. Trace tricuspid insufficiency is present.  Pulmonary artery systolic pressure cannot be assessed.     Pulmonic Valve  The pulmonic valve is normal. Trace pulmonic insufficiency is present.     Vessels  The aorta root is  normal. The pulmonary artery is normal. The inferior vena  cava is normal.     Pericardium  No pericardial effusion is present.        Compared to Previous Study  There has been no change.  _____________________________________________________________________________  __  MMode/2D Measurements & Calculations     IVSd: 0.78 cm  LVIDd: 4.9 cm  LVIDs: 2.8 cm  LVPWd: 0.79 cm  FS: 42.5 %  EDV(Teich): 113.8 ml  ESV(Teich): 30.3 ml  LV mass(C)d: 129.0 grams  LV mass(C)dI: 70.9 grams/m2  Ao root diam: 2.6 cm  asc Aorta Diam: 2.5 cm  LVOT diam: 2.2 cm  LVOT area: 3.7 cm2  LA Volume (BP): 39.0 ml  LA Volume Index (BP): 21.4 ml/m2           Doppler Measurements & Calculations  MV E max elisha: 98.7 cm/sec  MV A max elisha: 65.9 cm/sec  MV E/A: 1.5  MV dec time: 0.20 sec  Ao V2 max: 129.9 cm/sec  Ao max P.0 mmHg  Lateral E/e': 5.5  Medial E/e': 7.8           _____________________________________________________________________________  __           Report approved by: Richard Antunez 2017 11:54 AM          Assessment and Plan:  Ursula De La Cruz is a 27-year-old woman with previously documented PVCs that correlated with symptoms of palpitations.  She has had about a 10% burden of PVCs in the past.  Her symptoms have been sufficiently mild amount she has elected not to begin medical therapy. She is now 24 weeks pregnant and has noticed no worsening of her palpitations. She has no other cardiac symptoms. Her echo is normal and unchanged. She has a soft systolic murmur consistent with a flow murmur of pregnancy. Unless she has problems sooner, we will see her in follow-up in one year. It was a pleasure seeing Ursula De La Cruz today.        CC

## 2018-04-02 NOTE — LETTER
4/2/2018      RE: Ursula De La Cruz  [address] [facility] [address]       Dear Colleague,    Thank you for the opportunity to participate in the care of your patient, Ursula De La Cruz, at the [facility] HEART AT [facility]. Please see a copy of my visit note below.    HPI:  Ursula De La Cruz returns for follow-up of. We last saw her in October 2017. (I have also seen her as Ursula Desir.) She is a 27-year-old woman with previously documented PVCs that correlated with symptoms of palpitations. She had no evidence of structural heart disease by cardiac MRI in 2014 and on recent echo. She has had about a 10% burden of PVCs in the past. Her symptoms are sufficiently mild amount she prefers not to begin medical therapy.   Our plan was to see her back in one year unless she became pregnant in which case we would see her back during her second trimester. She is now 24 weeks pregnant and doing very well. She denies chest pain, shortness of breath, lightheadedness or fatigue. She notices minimal edema at this point. She continues to have palpitations but she reports they are no worse than previously.    She had an echo one week ago that showed normal left ventricular size and function. Ejection fraction 55-60%. The right ventricle was normal. There is no significant valve abnormalities. There was mild left atrial enlargement. Pulmonary artery pressures were normal. There is no change since her echo in 2017.      PAST MEDICAL HISTORY:  Past Medical History:   Diagnosis Date     Acne vulgaris      Coronary artery disease     PVCs-asymptomatic. not on any meds for this     Goiter, unspecified 3/17/2015     Hashimoto's thyroiditis 12/21/2016       CURRENT MEDICATIONS:  Current Outpatient Prescriptions   Medication Sig Dispense Refill     magnesium citrate solution Take 296 mLs by mouth once       Prenatal Vit-Fe Fumarate-FA (PRENATAL MULTIVITAMIN  PLUS IRON) 27-0.8 MG TABS per tablet Take 1 tablet by mouth daily       NATURE-THROID 32.5 MG TABS        polyethylene glycol (MIRALAX) powder 2-3 servings a day for 3-4 days, then change to 1 serving daily. 510 g 5     triamcinolone (KENALOG) 0.1 % cream Apply sparingly to affected area three times daily until rash resolves. (Patient not taking: Reported on 4/2/2018) 453.6 g 5       PAST SURGICAL HISTORY:  Past Surgical History:   Procedure Laterality Date     BACK SURGERY  2007    L1 compression fracture, no surgery     BREAST SURGERY  2014    elective augmentation     COSMETIC SURGERY      breast augmentation       ALLERGIES:   No Known Allergies    FAMILY HISTORY:  Family History   Problem Relation Age of Onset     Alcohol/Drug Father      Depression Father      Alcohol/Drug Maternal Grandmother      KIDNEY DISEASE Maternal Grandmother      Depression Maternal Grandmother      Lipids Maternal Grandmother      Endocrine Disease Maternal Grandmother      HEART DISEASE Maternal Grandmother      Coronary Artery Disease Maternal Grandmother      Hypertension Maternal Grandmother      CEREBROVASCULAR DISEASE Maternal Grandmother      Thyroid Disease Maternal Grandmother      Known Genetic Syndrome Maternal Grandmother      polycystic kidney disease     Alcohol/Drug Maternal Grandfather      Depression Maternal Grandfather      DIABETES Maternal Grandfather      Hyperlipidemia Maternal Grandfather      Alcohol/Drug Paternal Grandfather      Depression Paternal Grandfather      Depression Paternal Grandmother      KIDNEY DISEASE Mother      Endocrine Disease Mother      Thyroid Disease Mother      Thyroid Disease Mother      hypothyroidism     Known Genetic Syndrome Mother      polycystic kidney disease     CANCER No family hx of      Glaucoma No family hx of      Macular Degeneration No family hx of        SOCIAL HISTORY:  Social History   Substance Use Topics     Smoking status: Never Smoker     Smokeless tobacco:  Never Used     Alcohol use Yes      Comment: social use only       ROS:   Constitutional: No fever, chills, or sweats. Weight stable.   ENT: No visual disturbance, ear ache, epistaxis, sore throat.   Cardiovascular: As per HPI.   Respiratory: No cough, hemoptysis.    GI: No nausea, vomiting, hematemesis, melena, or hematochezia.   : No hematuria.   Integument: Negative.   Psychiatric: Negative.   Hematologic:  Easy bruising, no easy bleeding.  Neuro: Negative.   Endocrinology: No significant heat or cold intolerance   Musculoskeletal: No myalgia.    Exam:  /71 (BP Location: Right arm, Patient Position: Chair, Cuff Size: Adult Regular)  Pulse 73  Wt 80.7 kg (178 lb)  SpO2 100%  BMI 28.73 kg/m2  No acute distress.  Alert and oriented.  HEENT:  Normocephalic, atraumatic, sclera non-icteric, EOM intact, mucosa moist  Neck: No lymphadenopathy.  JVP 6 cm without HJR.  Cor: RRR. Normal S1 and S2.  Gr 1/6 early KRISTI at base.  No rub, or gallop.  PMI in Lf 5th ICS.  Lungs:  Clear  Abd: Gravid, nontender, bowel sounds present.  No hepatosplenomegaly..  Extremities: No C/C.  Trace edema.      Labs:  CBC RESULTS:   Lab Results   Component Value Date    WBC 7.2 02/12/2016    RBC 4.36 02/12/2016    HGB 13.6 02/12/2016    HCT 39.6 02/12/2016    MCV 91 02/12/2016    MCH 31.2 02/12/2016    MCHC 34.3 02/12/2016    RDW 11.9 02/12/2016     02/12/2016       BMP RESULTS:  Lab Results   Component Value Date     03/17/2015    POTASSIUM 3.6 03/17/2015    CHLORIDE 105 03/17/2015    CO2 28 03/17/2015    ANIONGAP 4 03/17/2015    GLC 80 03/17/2015    BUN 15 03/17/2015    CR 0.74 03/17/2015    GFRESTIMATED >90  Non  GFR Calc   03/17/2015    GFRESTBLACK >90   GFR Calc   03/17/2015    HELADIO 8.5 03/17/2015        INR RESULTS:  No results found for: INR    Procedures:  Echocardiogram:   Recent Results (from the past 8760 hour(s))   Echocardiogram Complete    Narrative     [id number]  [facility]ly [facility]  [id number]  [id number]^NICOLETTE^YAKELIN^           [facility]  Echocardiography Laboratory  [address] [place] [address]        Name: REYNA ARAUJO  MRN: [id number]  : 1990  Study Date: 2018 11:38 AM  Age: 27 yrs  Gender: Female  Patient Location: [facility]  Reason For Study: , PVC's (premature ventricular contractions). 23 weeks  pregnant  Ordering Physician: YAKELIN WILL  Referring Physician: YAKELIN WILL  Performed By: Maria Luisa Alonso RDCS     BSA: 1.8 m2  Height: 66 in  Weight: 156 lb  HR: 74  BP: 105/51 mmHg  _____________________________________________________________________________  __     _____________________________________________________________________________  __        Interpretation Summary     Global and regional left ventricular function is normal with an EF of 55-60%.  Left ventricular diastolic function is normal.  The right ventricle is normal size. Global right ventricular function is  normal.  No significant valvular dysfunction.  Pulmonary artery systolic pressure is normal.  Mild left atrial enlargement.     This study was compared with the study from 17 .  No significant change outside of mild increase in left atrial size  _____________________________________________________________________________  __        Left Ventricle  Left ventricular wall thickness is normal. Left ventricular size is normal.  Global and regional left ventricular function is normal with an EF of 55-60%.  Left ventricular diastolic function is normal.     Right Ventricle  The right ventricle is normal size. Global right ventricular function is  normal.     Atria  Mild left atrial enlargement is present. Mild right atrial enlargement is  present. The atrial septum is intact as assessed by color Doppler .     Mitral Valve  The mitral valve is normal. Trace to mild mitral insufficiency is present.        Aortic Valve  Aortic valve is normal in  structure and function. The aortic valve is  tricuspid. No aortic regurgitation is present.     Tricuspid Valve  Trace tricuspid insufficiency is present. The right ventricular systolic  pressure is approximated at 25.2 mmHg plus the right atrial pressure.  Pulmonary artery systolic pressure is normal.     Pulmonic Valve  The pulmonic valve is normal. Trace pulmonic insufficiency is present.     Vessels  The aorta root is normal. The inferior vena cava was normal in size with  preserved respiratory variability. Estimated mean right atrial pressure is 3  mmHg.     Pericardium  No pericardial effusion is present.        Compared to Previous Study  This study was compared with the study from 17 . No significant change  outside of mild increase in left atrial size.     Attestation  I have personally viewed the imaging and agree with the interpretation and  report as documented by the fellow, Mor Rhoades, and/or edited by me.  _____________________________________________________________________________  __     MMode/2D Measurements & Calculations  IVSd: 0.65 cm  LVIDd: 5.2 cm  LVIDs: 2.8 cm  LVPWd: 0.52 cm  FS: 45.8 %  LV mass(C)d: 99.5 grams  LV mass(C)dI: 55.3 grams/m2  Ao root diam: 2.8 cm  LA dimension: 4.2 cm  asc Aorta Diam: 2.5 cm  LA/Ao: 1.5  LVOT diam: 2.2 cm  LVOT area: 3.9 cm2  LA Volume (BP): 72.0 ml     LA Volume Index (BP): 40.0 ml/m2  RWT: 0.20        Doppler Measurements & Calculations  MV E max elisha: 95.8 cm/sec  MV A max elisha: 66.1 cm/sec  MV E/A: 1.4  MV dec time: 0.15 sec  Ao V2 max: 148.2 cm/sec  Ao max P.0 mmHg  DWAYNE(V,D): 3.5 cm2  LV V1 max P.1 mmHg  LV V1 max: 133.3 cm/sec  LV V1 VTI: 28.8 cm  CO(LVOT): 6.8 l/min  CI(LVOT): 3.8 l/min/m2  SV(LVOT): 113.5 ml  SI(LVOT): 63.1 ml/m2  PA V2 max: 107.0 cm/sec  PA max P.6 mmHg  TR max elisha: 251.1 cm/sec  TR max P.2 mmHg  Pulm Sys Elisha: 62.9 cm/sec  Pulm [place] Elisha: 60.9 cm/sec  Pulm S/D: 1.0  AV Elisha Ratio (DI): 0.90  E/E' av.5  Lateral  E/e': 6.1  [facility] E/e': 6.9              _____________________________________________________________________________  __           Report approved by: Richard Gomez 2018 02:35 PM      Echocardiogram Complete    Narrative    [id number]  [id number]  [id number]  [id number]^NICOLETTE^YAKELIN^           [facility]  Echocardiography Laboratory  [address] [place] [address]        Name: REYNA ARAUJO  MRN: [id number]  : 1990  Study Date: 2017 11:30 AM  Age: 27 yrs  Gender: Female  Patient Location: [facility]  Reason For Study: Ventricular premature depolarization  Ordering Physician: YAKELIN WILL  Referring Physician: YAKELIN WILL  Performed By: Selina Rico RDCS     BSA: 1.8 m2  Height: 66 in  Weight: 160 lb  BP: 114/57 mmHg  _____________________________________________________________________________  __        Procedure  Echocardiogram with two-dimensional, color and spectral Doppler performed.  _____________________________________________________________________________  __        Interpretation Summary     Global and regional left ventricular function is normal with an EF of 55-60%.  Right ventricular function, chamber size, wall motion, and thickness are  normal.  No pericardial effusion is present.  There has been no change.  _____________________________________________________________________________  __        Left Ventricle  Global and regional left ventricular function is normal with an EF of 55-60%.  Left ventricular wall thickness is normal. Left ventricular size is normal.  Normal left ventricular filling for age. No regional wall motion abnormalities  are seen.     Right Ventricle  Right ventricular function, chamber size, wall motion, and thickness are  normal.     Atria  Both atria appear normal. The atrial septum is intact as assessed by color  Doppler .     Mitral Valve  The mitral valve is normal. Trace to mild mitral insufficiency is  present.        Aortic Valve  Aortic valve is normal in structure and function.     Tricuspid Valve  The tricuspid valve is normal. Trace tricuspid insufficiency is present.  Pulmonary artery systolic pressure cannot be assessed.     Pulmonic Valve  The pulmonic valve is normal. Trace pulmonic insufficiency is present.     Vessels  The aorta root is normal. The pulmonary artery is normal. The inferior vena  cava is normal.     Pericardium  No pericardial effusion is present.        Compared to Previous Study  There has been no change.  _____________________________________________________________________________  __  MMode/2D Measurements & Calculations     IVSd: 0.78 cm  LVIDd: 4.9 cm  LVIDs: 2.8 cm  LVPWd: 0.79 cm  FS: 42.5 %  EDV(Teich): 113.8 ml  ESV(Teich): 30.3 ml  LV mass(C)d: 129.0 grams  LV mass(C)dI: 70.9 grams/m2  Ao root diam: 2.6 cm  asc Aorta Diam: 2.5 cm  LVOT diam: 2.2 cm  LVOT area: 3.7 cm2  LA Volume (BP): 39.0 ml  LA Volume Index (BP): 21.4 ml/m2           Doppler Measurements & Calculations  MV E max elisha: 98.7 cm/sec  MV A max elisha: 65.9 cm/sec  MV E/A: 1.5  MV dec time: 0.20 sec  Ao V2 max: 129.9 cm/sec  Ao max P.0 mmHg  Lateral E/e': 5.5  Medial E/e': 7.8     _____________________________________________________________________________  __     Report approved by: Richard Antunez 2017 11:54 AM          Assessment and Plan:  Ursula De La Cruz is a 27-year-old woman with previously documented PVCs that correlated with symptoms of palpitations.  She has had about a 10% burden of PVCs in the past.  Her symptoms have been sufficiently mild amount she has elected not to begin medical therapy. She is now 24 weeks pregnant and has noticed no worsening of her palpitations. She has no other cardiac symptoms. Her echo is normal and unchanged. She has a soft systolic murmur consistent with a flow murmur of pregnancy. Unless she has problems sooner, we will see her in follow-up in one year. It was a  pleasure seeing Ursula De La Cruz today.    Sincerely,     Candido Narvaez MD

## 2018-04-02 NOTE — MR AVS SNAPSHOT
After Visit Summary   4/2/2018    Ursula De La Cruz    MRN: [id number]           Patient Information     Date Of Birth          1990        Visit Information        Provider Department      4/2/2018 2:20 PM Candido Narvaez MD [facility] HEART AT [facility]        Today's Diagnoses     PVC's (premature ventricular contractions)    -  1    Pregnancy, incidental          Care Instructions    Thank you for coming to the [facility] Heart @ [facility]; please note the following instructions:    1.         If you have any questions regarding your visit please contact your care team:     Cardiology  Telephone Number   Juliet GONZALEZ, RN  Abraham CARTWRIGHT, RN   Kimber HOWARD, VERONICA BO, TALITA HENRIQUEZ, MA   [phone]    *After hours: [phone]   For scheduling appts:     [phone] or    [phone] (select option 1)    *After hours: [phone]     For the Device Clinic (Pacemakers and ICD's)  RN's :  Ashely Garces   During business hours: [phone]    *After business hours:  [phone] (select option 4)      Normal test result notifications will be released via [employer] or mailed within 7 business days.  All other test results, will be communicated via telephone once reviewed by your cardiologist.    If you need a medication refill please contact your pharmacy.  Please allow 3 business days for your refill to be completed.    As always, thank you for trusting us with your health care needs!            Follow-ups after your visit        Who to contact     If you have questions or need follow up information about today's clinic visit or your schedule please contact [facility] PHYSICIANS HEART AT [facility] directly at [phone].  Normal or non-critical lab and imaging results will be communicated to you by MyChart, letter or phone within 4 business days after the clinic has received the results. If you do not hear from us within 7  days, please contact the clinic through [employer] or phone. If you have a critical or abnormal lab result, we will notify you by phone as soon as possible.  Submit refill requests through [employer] or call your pharmacy and they will forward the refill request to us. Please allow 3 business days for your refill to be completed.          Additional Information About Your Visit        [employer] Information     [employer] gives you secure access to your electronic health record. If you see a primary care provider, you can also send messages to your care team and make appointments. If you have questions, please call your primary care clinic.  If you do not have a primary care provider, please call [phone] and they will assist you.        Care EveryWhere ID     This is your Care EveryWhere ID. This could be used by other organizations to access your [place] medical records  [id number]        Your Vitals Were     Pulse Pulse Oximetry BMI (Body Mass Index)             73 100% 28.73 kg/m2          Blood Pressure from Last 3 Encounters:   04/02/18 110/71   10/16/17 131/84   11/09/16 110/72    Weight from Last 3 Encounters:   04/02/18 80.7 kg (178 lb)   12/21/16 70.8 kg (156 lb)   11/09/16 72.6 kg (160 lb)              Today, you had the following     No orders found for display       Primary Care Provider Office Phone # Fax #    Toni Scott [phone]       [address]  [facility] [address]        Equal Access to Services     JAVED BUCHANAN : Hadii aad ku hadasho Soomaali, waaxda luqadaha, qaybta kaalmada adeegyayury, maria e ocasio . So [facility] [phone].    ATENCIÓN: Si habla livanañol, tiene a ece disposición servicios gratuitos de asistencia lingüística. Llame al [phone].    We comply with applicable federal civil rights laws and [place] laws. We do not discriminate on the basis of race, color, national origin, age, disability, sex, sexual orientation, or gender  identity.            Thank you!     Thank you for choosing [facility] PHYSICIANS HEART AT [facility]  for your care. Our goal is always to provide you with excellent care. Hearing back from our patients is one way we can continue to improve our services. Please take a few minutes to complete the written survey that you may receive in the mail after your visit with us. Thank you!             Your Updated Medication List - Protect others around you: Learn how to safely use, store and throw away your medicines at www.disposemymeds.org.          This list is accurate as of 4/2/18  2:56 PM.  Always use your most recent med list.                   Brand Name Dispense Instructions for use Diagnosis    magnesium citrate solution      Take 296 mLs by mouth once        NATURE-THROID 32.5 MG Tabs   Generic drug:  thyroid           polyethylene glycol powder    MIRALAX    510 g    2-3 servings a day for 3-4 days, then change to 1 serving daily.    Chronic constipation       prenatal multivitamin plus iron 27-0.8 MG Tabs per tablet      Take 1 tablet by mouth daily        triamcinolone 0.1 % cream    KENALOG    453.6 g    Apply sparingly to affected area three times daily until rash resolves.    Rash and nonspecific skin eruption

## 2018-04-02 NOTE — NURSING NOTE
Med Reconcile: Reviewed and verified all current medications with the patient. The updated medication list was printed and given to the patient.  Return Appointment: Patient given instructions regarding scheduling next clinic visit. Patient demonstrated understanding of this information and agreed to call with further questions or concerns.  1 year follow up  Patient stated she understood all health information given and agreed to call with further questions or concerns.  Juliet Navarrete RN

## 2018-05-20 ENCOUNTER — HEALTH MAINTENANCE LETTER (OUTPATIENT)
Age: 28
End: 2018-05-20

## 2018-07-14 ENCOUNTER — TRANSFERRED RECORDS (OUTPATIENT)
Dept: HEALTH INFORMATION MANAGEMENT | Facility: CLINIC | Age: 28
End: 2018-07-14

## 2018-07-16 DIAGNOSIS — R94.6 NONSPECIFIC ABNORMAL RESULTS OF THYROID FUNCTION STUDY: Primary | ICD-10-CM

## 2018-07-26 DIAGNOSIS — R79.89 ABNORMAL THYROID SCREEN (BLOOD): Primary | ICD-10-CM

## 2018-09-25 NOTE — NURSING NOTE
Dermatology Rooming Note    Ursula De La Cruz's goals for this visit include:   Chief Complaint   Patient presents with     Derm Problem     rash on wrist and arms        Is a scribe okay for this visit:YES,    Are records needed for this visit(If yes, obtain release of information): No     Vitals: There were no vitals taken for this visit.    Referring Provider:  Referred Self, MD  No address on file         Review of Systems/Medical History  Patient summary reviewed  Chart reviewed  History of anesthetic complications PONV    Cardiovascular  Negative cardio ROS    Pulmonary  Negative pulmonary ROS        GI/Hepatic  Negative GI/hepatic ROS               Endo/Other     GYN       Hematology   Musculoskeletal       Neurology   Psychology           Physical Exam    Airway    Mallampati score: II  TM Distance: >3 FB  Neck ROM: full     Dental       Cardiovascular  Comment: Negative ROS,     Pulmonary      Other Findings        Anesthesia Plan  ASA Score- 1     Anesthesia Type- general with ASA Monitors  Additional Monitors:   Airway Plan: ETT  Plan Factors-    Induction- intravenous  Postoperative Plan-     Informed Consent- Anesthetic plan and risks discussed with patient  I personally reviewed this patient with the CRNA  Discussed and agreed on the Anesthesia Plan with the CRNA  Freddy Vines

## 2019-07-01 ENCOUNTER — TELEPHONE (OUTPATIENT)
Dept: CARDIOLOGY | Facility: CLINIC | Age: 29
End: 2019-07-01

## 2019-07-01 DIAGNOSIS — I49.3 PVC'S (PREMATURE VENTRICULAR CONTRACTIONS): Primary | ICD-10-CM

## 2019-07-01 NOTE — TELEPHONE ENCOUNTER
Spoke to patient whom states that her PVC's are still there and sometimes she feels they are more frequent.  She is requesting a repeat monitor since the last one completed was 2014 and she wants to know what her burden is.  States she can tolerate the PVC's but is feeling them more frequently at times.  Also wanting an echocardiogram to make sure hre heart function is ok.   holter and echo scheduled.  Patient is scheduled to see Dr. Rucker 7/30.    Juliet Navarrete RN

## 2019-07-01 NOTE — TELEPHONE ENCOUNTER
[facility]    Phone Message    May a detailed message be left on voicemail: yes    Reason for Call: Other: PT called back. Offered to schedule echo but she mentioned that Juliet advised to potentially be seen sooner. Please call pt back.      Action Taken: Message routed to:  [facility]

## 2019-07-01 NOTE — TELEPHONE ENCOUNTER
Echo ordered due to increased symptoms of PVC's.  Attempted to contact patient, no answer.  Message left to discuss symptoms.    Juliet Navarrete RN

## 2019-07-01 NOTE — TELEPHONE ENCOUNTER
[facility]    Phone Message    May a detailed message be left on voicemail: yes    Reason for Call: Symptoms or Concerns     If patient has red-flag symptoms, warm transfer to triage line    Current symptom or concern: Palpitations    Symptoms have been present for: Pt said that they have been getting worse over time.     Has patient previously been seen for this? Yes    By : Previously Dr Narvaez    Date: 04/2018    Are there any new or worsening symptoms? Yes: Pt has a scheduled appt with Dr Briceno on 7/30/2019. She said she usually does an echo prior to appt. She is wondering if she should do an echo prior to the appt or not. Please reach out to pt to discu.       Action Taken: Message routed to:  [facility]

## 2019-07-09 ENCOUNTER — ANCILLARY PROCEDURE (OUTPATIENT)
Dept: CARDIOLOGY | Facility: CLINIC | Age: 29
End: 2019-07-09
Attending: INTERNAL MEDICINE
Payer: COMMERCIAL

## 2019-07-09 DIAGNOSIS — I49.3 PVC'S (PREMATURE VENTRICULAR CONTRACTIONS): ICD-10-CM

## 2019-07-09 PROCEDURE — 93224 XTRNL ECG REC UP TO 48 HRS: CPT | Performed by: INTERNAL MEDICINE

## 2019-07-09 NOTE — PROGRESS NOTES
Per Dr. Briceno, patient to have 48 monitor placed.  Diagnosis: I49.3- PVC's  Monitor placed: Yes  Patient Instructed: Yes  Patient verbalized understanding: Yes  Holter # 3  VERONICA Vasquez

## 2019-07-12 ENCOUNTER — ANCILLARY PROCEDURE (OUTPATIENT)
Dept: CARDIOLOGY | Facility: CLINIC | Age: 29
End: 2019-07-12
Attending: INTERNAL MEDICINE
Payer: COMMERCIAL

## 2019-07-12 DIAGNOSIS — I49.3 PVC'S (PREMATURE VENTRICULAR CONTRACTIONS): ICD-10-CM

## 2019-07-12 PROCEDURE — 93306 TTE W/DOPPLER COMPLETE: CPT | Performed by: INTERNAL MEDICINE

## 2019-07-30 ENCOUNTER — OFFICE VISIT (OUTPATIENT)
Dept: CARDIOLOGY | Facility: CLINIC | Age: 29
End: 2019-07-30
Payer: COMMERCIAL

## 2019-07-30 VITALS
SYSTOLIC BLOOD PRESSURE: 118 MMHG | DIASTOLIC BLOOD PRESSURE: 80 MMHG | BODY MASS INDEX: 26.79 KG/M2 | WEIGHT: 166 LBS | OXYGEN SATURATION: 99 % | HEART RATE: 72 BPM

## 2019-07-30 DIAGNOSIS — I49.3 PVC'S (PREMATURE VENTRICULAR CONTRACTIONS): Primary | ICD-10-CM

## 2019-07-30 DIAGNOSIS — E06.3 HYPOTHYROIDISM DUE TO HASHIMOTO'S THYROIDITIS: ICD-10-CM

## 2019-07-30 PROCEDURE — 99214 OFFICE O/P EST MOD 30 MIN: CPT | Performed by: INTERNAL MEDICINE

## 2019-07-30 PROCEDURE — 93000 ELECTROCARDIOGRAM COMPLETE: CPT | Performed by: INTERNAL MEDICINE

## 2019-07-30 NOTE — PATIENT INSTRUCTIONS
Thank you for coming to the [facility] @ [facility]; please note the following instructions:    1. Dr. Rinku Briceno would like you to return for a cardiac follow up in 2 years  (July).  We will contact you regarding your appointment when the time draws closer or you may call [phone] to arrange an appointment.  Mean while, if you should have any questions or concerns regarding your heart health, please contact us.  Thank you for choosing [facility] for your care.    2. DR Briceno is recommending a Cardiac Ablation  procedure, please  Schedule @ telephone [phone] when time allows   .            If you have any questions regarding your visit please contact your care team:     Cardiology  Telephone Number   Juliet BONILLA., RN  Maddison AVILES,RN  Kimber HOWARD, VERONICA HENRIQUEZ, CHARISMA OVIEDO, JEAN CARLOSN   [phone]    *After hours: [phone]   For scheduling appts:     [phone] or    [phone] (select option 1)    *After hours: [phone]     For the Device Clinic (Pacemakers and ICD's)  RN's :  Ashely Garces   During business hours: [phone]    *After business hours:  [phone] (select option 4)      Normal test result notifications will be released via [employer] or mailed within 7 business days.  All other test results, will be communicated via telephone once reviewed by your cardiologist.    If you need a medication refill please contact your pharmacy.  Please allow 3 business days for your refill to be completed.    As always, thank you for trusting us with your health care needs!

## 2019-07-30 NOTE — LETTER
7/30/2019      RE: Ursula De La Cruz  [address] [facility] [address]       Dear Colleague,    Thank you for the opportunity to participate in the care of your patient, Ursula De La Cruz, at the [facility] AT [facility]. Please see a copy of my visit note below.    Referring provider:Candido Narvaez MD     Chief complaint: palpitations    HPI: Ms. Usrula De La Cruz is a 29 year old  female with PMH significant for frequent PVCs and hypothyroidism.    The patient returns for follow-up to recheck on frequent PVCs.  She has been followed in cardiology since 2014.  Last cardiology visit was 1 year ago when she was pregnant and she reported overall unchanged frequency of PVCs during pregnancy.    She recently had a 48-hour Holter monitoring (I have personally reviewed) on 7/9/2019 which showed frequent bigeminal PVCs total burden 27%.  The patient reports daily PVCs sometimes lasting up to an hour. She feels PVCs mostly when she is not busy. 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  She reports feeling nauseous and not feeling well when she has bigeminal PVCs.  Otherwise she denies associated shortness of breath, dizziness, chest pain or syncope.  Patient has noticed that coffee triggers more PVCs.  Physical activity does not make it worse.    The patient is physically active in her daily life and has no other cardiac symptoms.  No prior history of hypertension, diabetes, hyperlipidemia, tobacco abuse or family history of sudden cardiac death/coronary artery disease.  Her mother has history of polycystic kidney disease. She did not undergo screening for PCK.    She had a cardiac MRI in 2014 which showed a structurally normal heart.  Since 2014 she had several echocardiograms most recent one on 7/12/2019 which I have personally reviewed.  She has  normal LV/RV size and function.     She is currently only on thyroid hormone replacement.     I reviewed patient's EKG in clinic today which shows bigeminal PVCs with inferior axis and left bundle branch block morphology.    Medications, personal, family, and social history reviewed with patient and revised.    PAST MEDICAL HISTORY:  Past Medical History:   Diagnosis Date     Acne vulgaris      Coronary artery disease     PVCs-asymptomatic. not on any meds for this     Goiter, unspecified 3/17/2015     Hashimoto's thyroiditis 12/21/2016       CURRENT MEDICATIONS:  Current Outpatient Medications   Medication Sig Dispense Refill     magnesium citrate solution Take 296 mLs by mouth once       NATURE-THROID 32.5 MG TABS        polyethylene glycol (MIRALAX) powder 2-3 servings a day for 3-4 days, then change to 1 serving daily. 510 g 5     Prenatal Vit-Fe Fumarate-FA (PRENATAL MULTIVITAMIN PLUS IRON) 27-0.8 MG TABS per tablet Take 1 tablet by mouth daily       triamcinolone (KENALOG) 0.1 % cream Apply sparingly to affected area three times daily until rash resolves. (Patient not taking: Reported on 4/2/2018) 453.6 g 5       PAST SURGICAL HISTORY:  Past Surgical History:   Procedure Laterality Date     BACK SURGERY  2007    L1 compression fracture, no surgery     BREAST SURGERY  2014    elective augmentation     COSMETIC SURGERY      breast augmentation       ALLERGIES:   No Known Allergies    FAMILY HISTORY:  Family History   Problem Relation Age of Onset     Alcohol/Drug Father      Depression Father      Alcohol/Drug Maternal Grandmother      Kidney Disease Maternal Grandmother      Depression Maternal Grandmother      Lipids Maternal Grandmother      Endocrine Disease Maternal Grandmother      Heart Disease Maternal Grandmother      Coronary Artery Disease Maternal Grandmother      Hypertension Maternal Grandmother      Cerebrovascular Disease Maternal Grandmother      Thyroid Disease Maternal Grandmother      Known  Genetic Syndrome Maternal Grandmother         polycystic kidney disease     Alcohol/Drug Maternal Grandfather      Depression Maternal Grandfather      Diabetes Maternal Grandfather      Hyperlipidemia Maternal Grandfather      Alcohol/Drug Paternal Grandfather      Depression Paternal Grandfather      Depression Paternal Grandmother      Kidney Disease Mother      Endocrine Disease Mother      Thyroid Disease Mother      Thyroid Disease Mother         hypothyroidism     Known Genetic Syndrome Mother         polycystic kidney disease     Cancer No family hx of      Glaucoma No family hx of      Macular Degeneration No family hx of          SOCIAL HISTORY:  Social History     Tobacco Use     Smoking status: Never Smoker     Smokeless tobacco: Never Used   Substance Use Topics     Alcohol use: Yes     Comment: social use only     Drug use: No       ROS:   Constitutional: No fever, chills, or sweats. Weight stable.   ENT: No visual disturbance, ear ache, epistaxis, sore throat.   Cardiovascular: As per HPI.   Respiratory: No cough, hemoptysis.    GI: No nausea, vomiting, hematemesis, melena, or hematochezia.   : No hematuria.   Integument: Negative.   Psychiatric: Negative.   Hematologic:  No easy bruising, no easy bleeding.  Neuro: Negative.   Endocrinology: No significant heat or cold intolerance   Musculoskeletal: No myalgia.    Exam:  /80 (BP Location: Left arm, Patient Position: Sitting, Cuff Size: Adult Large)   Pulse 72   Wt 75.3 kg (166 lb)   SpO2 99%   BMI 26.79 kg/m     GENERAL APPEARANCE: alert and no distress  HEENT: no icterus, no central cyanosis  LYMPH/NECK: no adenopathy, no asymmetry, JVP not elevated, no carotid bruits.  RESPIRATORY: lungs clear to auscultation - no rales, rhonchi or wheezes, no use of accessory muscles, no retractions, respirations are unlabored, normal respiratory rate  CARDIOVASCULAR: regular rhythm, normal S1, S2, no S3 or S4 and no murmur, click or rub, precordium  quiet with normal PMI.  GI: soft, non tender  EXTREMITIES: peripheral pulses normal, no edema  NEURO: alert, normal speech,and affect  VASC: Radial, dorsalis pedis and posterior tibialis pulses are normal in volumes and symmetric bilaterally.   SKIN: no ecchymoses, no rashes     I have reviewed the labs and personally reviewed the imaging below (EKG, zio patch and echo) and made my comment in the assessment and plan.    Labs:  CBC RESULTS:   Lab Results   Component Value Date    WBC 7.2 02/12/2016    RBC 4.36 02/12/2016    HGB 13.6 02/12/2016    HCT 39.6 02/12/2016    MCV 91 02/12/2016    MCH 31.2 02/12/2016    MCHC 34.3 02/12/2016    RDW 11.9 02/12/2016     02/12/2016       BMP RESULTS:  Lab Results   Component Value Date     03/17/2015    POTASSIUM 3.6 03/17/2015    CHLORIDE 105 03/17/2015    CO2 28 03/17/2015    ANIONGAP 4 03/17/2015    GLC 80 03/17/2015    BUN 15 03/17/2015    CR 0.74 03/17/2015    GFRESTIMATED >90  Non  GFR Calc   03/17/2015    GFRESTBLACK >90   GFR Calc   03/17/2015    HELADIO 8.5 03/17/2015      Holter monitor for 48 hours 7/9/2019: Frequent PVCs 27%    Echocardiogram 7/12/2019  No structural source of arrhythmia is identified.  Left and right ventricular function, chamber size, wall motion, and wall  thickness are normal.The LVEF is 55-60%.  No significant valvular abnormalities were noted.  This study was compared with the study from 3/23/18: There has been no change.    Cardiac MRI 6/2/2014  1.  Normal left ventricular size and systolic function with a  calculated ejection fraction of  61 %.  2.  Normal right ventricular size and systolic function with a  calculated ejection fraction of 58%.   3.  On delayed enhancement imaging, there is no abnormal  hyperenhancement to suggest myocardial scar/inflammation/infiltration    Assessment and Plan:     Ms. Ursula De La Cruz is a 29 year old  female with PMH significant for frequent PVCs and  hypothyroidism.    Frequent symptomatic idiopathic outflow tract PVCs: Patient has a diagnosis of frequent PVCs since 2014.  She has daily symptoms. When PVCs are more frequent she feels nauseous and uncomfortable in her chest.  No syncope.  No evidence of LV dysfunction.  Most recent echo showed normal LV size and EF.  Holter monitor 7/9/2019 showed 27% PVC.  The PVCs appear to originate from outflow tract most likely RV outflow tract (the EKG we obtained today showing bigeminal PVCs were recorded after the patient did arm pulling maneuver).    Since the patient is symptomatic with PVCs,  I have discussed treatment options being medical treatment versus catheter ablation.  The patient does not want to start any new medication due to her concerns over side effects.  I discussed with the patient the catheter ablation option.  She expressed her interest however she wants to think about it and discuss with her family.  She will let us know if she wants to proceed with catheter ablation in that case I will refer her to EP.  Otherwise I recommended 2 year follow-up in cardiology.    A total of 30 minutes spent face-toface with greater than 50% of the time spent in counseling and coordinating cares of the issues above.     Please donot hesitate to contact me if you have any questions or concerns. Again, thank you for allowing me to participate in the care of your patient.    Rinku FOWLER MD  [facility] Division of Cardiology  Pager [phone]

## 2019-07-30 NOTE — PROGRESS NOTES
Referring provider:Candido Narvaez MD     Chief complaint: palpitations    HPI: Ms. Ursula De La Cruz is a 29 year old  female with PMH significant for frequent PVCs and hypothyroidism.    The patient returns for follow-up to recheck on frequent PVCs.  She has been followed in cardiology since 2014.  Last cardiology visit was 1 year ago when she was pregnant and she reported overall unchanged frequency of PVCs during pregnancy.    She recently had a 48-hour Holter monitoring (I have personally reviewed) on 7/9/2019 which showed frequent bigeminal PVCs total burden 27%.  The patient reports daily PVCs sometimes lasting up to an hour. She feels PVCs mostly when she is not busy. 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  She reports feeling nauseous and not feeling well when she has bigeminal PVCs.  Otherwise she denies associated shortness of breath, dizziness, chest pain or syncope.  Patient has noticed that coffee triggers more PVCs.  Physical activity does not make it worse.    The patient is physically active in her daily life and has no other cardiac symptoms.  No prior history of hypertension, diabetes, hyperlipidemia, tobacco abuse or family history of sudden cardiac death/coronary artery disease.  Her mother has history of polycystic kidney disease. She did not undergo screening for PCK.    She had a cardiac MRI in 2014 which showed a structurally normal heart.  Since 2014 she had several echocardiograms most recent one on 7/12/2019 which I have personally reviewed.  She has normal LV/RV size and function.     She is currently only on thyroid hormone replacement.     I reviewed patient's EKG in clinic today which shows bigeminal PVCs with inferior axis and left bundle branch block morphology.    Medications, personal, family, and social history reviewed with patient and revised.    PAST MEDICAL HISTORY:  Past Medical History:    Diagnosis Date     Acne vulgaris      Coronary artery disease     PVCs-asymptomatic. not on any meds for this     Goiter, unspecified 3/17/2015     Hashimoto's thyroiditis 12/21/2016       CURRENT MEDICATIONS:  Current Outpatient Medications   Medication Sig Dispense Refill     magnesium citrate solution Take 296 mLs by mouth once       NATURE-THROID 32.5 MG TABS        polyethylene glycol (MIRALAX) powder 2-3 servings a day for 3-4 days, then change to 1 serving daily. 510 g 5     Prenatal Vit-Fe Fumarate-FA (PRENATAL MULTIVITAMIN PLUS IRON) 27-0.8 MG TABS per tablet Take 1 tablet by mouth daily       triamcinolone (KENALOG) 0.1 % cream Apply sparingly to affected area three times daily until rash resolves. (Patient not taking: Reported on 4/2/2018) 453.6 g 5       PAST SURGICAL HISTORY:  Past Surgical History:   Procedure Laterality Date     BACK SURGERY  2007    L1 compression fracture, no surgery     BREAST SURGERY  2014    elective augmentation     COSMETIC SURGERY      breast augmentation       ALLERGIES:   No Known Allergies    FAMILY HISTORY:  Family History   Problem Relation Age of Onset     Alcohol/Drug Father      Depression Father      Alcohol/Drug Maternal Grandmother      Kidney Disease Maternal Grandmother      Depression Maternal Grandmother      Lipids Maternal Grandmother      Endocrine Disease Maternal Grandmother      Heart Disease Maternal Grandmother      Coronary Artery Disease Maternal Grandmother      Hypertension Maternal Grandmother      Cerebrovascular Disease Maternal Grandmother      Thyroid Disease Maternal Grandmother      Known Genetic Syndrome Maternal Grandmother         polycystic kidney disease     Alcohol/Drug Maternal Grandfather      Depression Maternal Grandfather      Diabetes Maternal Grandfather      Hyperlipidemia Maternal Grandfather      Alcohol/Drug Paternal Grandfather      Depression Paternal Grandfather      Depression Paternal Grandmother      Kidney Disease  Mother      Endocrine Disease Mother      Thyroid Disease Mother      Thyroid Disease Mother         hypothyroidism     Known Genetic Syndrome Mother         polycystic kidney disease     Cancer No family hx of      Glaucoma No family hx of      Macular Degeneration No family hx of          SOCIAL HISTORY:  Social History     Tobacco Use     Smoking status: Never Smoker     Smokeless tobacco: Never Used   Substance Use Topics     Alcohol use: Yes     Comment: social use only     Drug use: No       ROS:   Constitutional: No fever, chills, or sweats. Weight stable.   ENT: No visual disturbance, ear ache, epistaxis, sore throat.   Cardiovascular: As per HPI.   Respiratory: No cough, hemoptysis.    GI: No nausea, vomiting, hematemesis, melena, or hematochezia.   : No hematuria.   Integument: Negative.   Psychiatric: Negative.   Hematologic:  No easy bruising, no easy bleeding.  Neuro: Negative.   Endocrinology: No significant heat or cold intolerance   Musculoskeletal: No myalgia.    Exam:  /80 (BP Location: Left arm, Patient Position: Sitting, Cuff Size: Adult Large)   Pulse 72   Wt 75.3 kg (166 lb)   SpO2 99%   BMI 26.79 kg/m    GENERAL APPEARANCE: alert and no distress  HEENT: no icterus, no central cyanosis  LYMPH/NECK: no adenopathy, no asymmetry, JVP not elevated, no carotid bruits.  RESPIRATORY: lungs clear to auscultation - no rales, rhonchi or wheezes, no use of accessory muscles, no retractions, respirations are unlabored, normal respiratory rate  CARDIOVASCULAR: regular rhythm, normal S1, S2, no S3 or S4 and no murmur, click or rub, precordium quiet with normal PMI.  GI: soft, non tender  EXTREMITIES: peripheral pulses normal, no edema  NEURO: alert, normal speech,and affect  VASC: Radial, dorsalis pedis and posterior tibialis pulses are normal in volumes and symmetric bilaterally.   SKIN: no ecchymoses, no rashes     I have reviewed the labs and personally reviewed the imaging below (EKG, osmany  patch and echo) and made my comment in the assessment and plan.    Labs:  CBC RESULTS:   Lab Results   Component Value Date    WBC 7.2 02/12/2016    RBC 4.36 02/12/2016    HGB 13.6 02/12/2016    HCT 39.6 02/12/2016    MCV 91 02/12/2016    MCH 31.2 02/12/2016    MCHC 34.3 02/12/2016    RDW 11.9 02/12/2016     02/12/2016       BMP RESULTS:  Lab Results   Component Value Date     03/17/2015    POTASSIUM 3.6 03/17/2015    CHLORIDE 105 03/17/2015    CO2 28 03/17/2015    ANIONGAP 4 03/17/2015    GLC 80 03/17/2015    BUN 15 03/17/2015    CR 0.74 03/17/2015    GFRESTIMATED >90  Non  GFR Calc   03/17/2015    GFRESTBLACK >90   GFR Calc   03/17/2015    HELADIO 8.5 03/17/2015      Holter monitor for 48 hours 7/9/2019: Frequent PVCs 27%    Echocardiogram 7/12/2019  No structural source of arrhythmia is identified.  Left and right ventricular function, chamber size, wall motion, and wall  thickness are normal.The LVEF is 55-60%.  No significant valvular abnormalities were noted.  This study was compared with the study from 3/23/18: There has been no change.    Cardiac MRI 6/2/2014  1.  Normal left ventricular size and systolic function with a  calculated ejection fraction of  61 %.  2.  Normal right ventricular size and systolic function with a  calculated ejection fraction of 58%.   3.  On delayed enhancement imaging, there is no abnormal  hyperenhancement to suggest myocardial scar/inflammation/infiltration    Assessment and Plan:     Ms. Ursula De La Cruz is a 29 year old  female with PMH significant for frequent PVCs and hypothyroidism.    Frequent symptomatic idiopathic outflow tract PVCs: Patient has a diagnosis of frequent PVCs since 2014.  She has daily symptoms. When PVCs are more frequent she feels nauseous and uncomfortable in her chest.  No syncope.  No evidence of LV dysfunction.  Most recent echo showed normal LV size and EF.  Holter monitor 7/9/2019 showed 27% PVC.  The PVCs  appear to originate from outflow tract most likely RV outflow tract (the EKG we obtained today showing bigeminal PVCs were recorded after the patient did arm pulling maneuver).    Since the patient is symptomatic with PVCs,  I have discussed treatment options being medical treatment versus catheter ablation.  The patient does not want to start any new medication due to her concerns over side effects.  I discussed with the patient the catheter ablation option.  She expressed her interest however she wants to think about it and discuss with her family.  She will let us know if she wants to proceed with catheter ablation in that case I will refer her to EP.  Otherwise I recommended 2 year follow-up in cardiology.    A total of 30 minutes spent face-toface with greater than 50% of the time spent in counseling and coordinating cares of the issues above.     Please donot hesitate to contact me if you have any questions or concerns. Again, thank you for allowing me to participate in the care of your patient.    Rinku FOWLER MD  [facility] Division of Cardiology  Pager [phone]

## 2019-07-30 NOTE — NURSING NOTE
"Chief Complaint   Patient presents with     Follow Up      1 yr. follow up for PVC's. - dr Per patient Holter and Echo. due to increased palpitations, and occational lightheaded.       Initial /80 (BP Location: Left arm, Patient Position: Sitting, Cuff Size: Adult Large)   Pulse 72   Wt 75.3 kg (166 lb)   SpO2 99%   BMI 26.79 kg/m   Estimated body mass index is 26.79 kg/m  as calculated from the following:    Height as of 12/21/16: 1.676 m (5' 6\").    Weight as of this encounter: 75.3 kg (166 lb)..  BP completed using cuff size: large    Johnnie Holt L.P.KIMBERLY  Ekg. Test procedure explained to patient .Ekg.test performed today per Provider order.Then Ekg. Result relayed  to provider for review.  Johnnie Holt L.P.N.          "

## 2019-09-01 ENCOUNTER — TRANSFERRED RECORDS (OUTPATIENT)
Dept: HEALTH INFORMATION MANAGEMENT | Facility: CLINIC | Age: 29
End: 2019-09-01

## 2019-09-01 LAB — PAP SMEAR - HIM PATIENT REPORTED: NEGATIVE

## 2019-09-26 NOTE — TELEPHONE ENCOUNTER
RECORDS RECEIVED FROM: Internal   DATE RECEIVED: 10-23   NOTES STATUS DETAILS   OFFICE NOTE from referring provider    Internal 7-30-19 Dr. Briceno   OFFICE NOTE from other cardiologists   and neurologists Internal 7-30-19 Dr. Briceno   DISCHARGE SUMMARY from hospital    N/A    DISCHARGE REPORT from the ER   N/A    OPERATIVE REPORT    N/A    MEDICATION LIST   Internal    LABS     BMP   N/A    CBC   N/A    CMP   N/A    Lipids   N/A    TSH   N/A    DIAGNOSTIC PROCEDURES     EKG  Strips *important Internal 7-30-19   Monitor Reports  Strips *important Internal 7-9-19   Cardioversions   N/A    ICD/pacemaker implant   No    Tilt table studies   N/A    IMAGING (DISC & REPORT)      ECHO's   Internal 7-12-19   Stress Tests   N/A    Cath   N/A    CT/CTA   N/A    MRI/MRA   N/A

## 2019-10-07 ENCOUNTER — DOCUMENTATION ONLY (OUTPATIENT)
Dept: CARE COORDINATION | Facility: CLINIC | Age: 29
End: 2019-10-07

## 2019-10-23 ENCOUNTER — PRE VISIT (OUTPATIENT)
Dept: CARDIOLOGY | Facility: CLINIC | Age: 29
End: 2019-10-23

## 2019-10-23 ENCOUNTER — OFFICE VISIT (OUTPATIENT)
Dept: CARDIOLOGY | Facility: CLINIC | Age: 29
End: 2019-10-23
Attending: FAMILY MEDICINE
Payer: COMMERCIAL

## 2019-10-23 VITALS
HEIGHT: 66 IN | SYSTOLIC BLOOD PRESSURE: 112 MMHG | WEIGHT: 165.9 LBS | HEART RATE: 65 BPM | OXYGEN SATURATION: 100 % | BODY MASS INDEX: 26.66 KG/M2 | DIASTOLIC BLOOD PRESSURE: 73 MMHG

## 2019-10-23 DIAGNOSIS — I49.3 PVC'S (PREMATURE VENTRICULAR CONTRACTIONS): Primary | ICD-10-CM

## 2019-10-23 LAB — INTERPRETATION ECG - MUSE: NORMAL

## 2019-10-23 PROCEDURE — G0463 HOSPITAL OUTPT CLINIC VISIT: HCPCS | Mod: 25,ZF

## 2019-10-23 PROCEDURE — 93005 ELECTROCARDIOGRAM TRACING: CPT | Mod: ZF

## 2019-10-23 PROCEDURE — 99214 OFFICE O/P EST MOD 30 MIN: CPT | Mod: GC | Performed by: INTERNAL MEDICINE

## 2019-10-23 PROCEDURE — 93010 ELECTROCARDIOGRAM REPORT: CPT | Mod: ZP | Performed by: INTERNAL MEDICINE

## 2019-10-23 RX ORDER — SODIUM CHLORIDE 9 MG/ML
INJECTION, SOLUTION INTRAVENOUS CONTINUOUS
Status: CANCELLED | OUTPATIENT
Start: 2019-10-23

## 2019-10-23 RX ORDER — LIDOCAINE 40 MG/G
CREAM TOPICAL
Status: CANCELLED | OUTPATIENT
Start: 2019-10-23

## 2019-10-23 ASSESSMENT — PAIN SCALES - GENERAL: PAINLEVEL: NO PAIN (0)

## 2019-10-23 ASSESSMENT — MIFFLIN-ST. JEOR: SCORE: 1484.27

## 2019-10-23 NOTE — LETTER
10/23/2019      RE: Ursula De La Cruz  [address] [facility], [place] [address]       Dear Colleague,    Thank you for the opportunity to participate in the care of your patient, Ursula De La Cruz, at the [facility] HEART [facility] at [facility]. Please see a copy of my visit note below.    Electrophysiology Clinic Note  HPI:     Ms. De La Cruz is a 29 year old female with PMHx of PVC and hypothyroidism who presents today for referral for PVC ablation.    Patient has a significant history of PVCs. She underwent Holter Monitor in 2014 due to symptoms of SOB and palpitations. Holter Monitor revealed a PVC burden of 9.6% with two patient triggered events related to her PVCs. She underwent CMRI in 2014 that revealed no significant structural heart disease. At the time, she deferred medical management. Recently, patient developed increasing episodes of palpations and nausea. She reached out to Dr. Briceno's clinic due to concern for increasing PVC frequency. A zio patch was ordered which revealed 27% PVC burden and episodes of dizziness and nausea associated with PVCs. She was then referred to EP clinic for further management.    During her presentation to clinic today, she endorsed nausea and dizziness throughout the month. She denied palpitations, lightheadedness, SOB, PND and orthopnea. EKG: PVCs in bigeminy Medications: Triamcinolone, Miralax.       PAST MEDICAL HISTORY:  Past Medical History:   Diagnosis Date     Acne vulgaris      Coronary artery disease     PVCs-asymptomatic. not on any meds for this     Goiter, unspecified 3/17/2015     Hashimoto's thyroiditis 12/21/2016       CURRENT MEDICATIONS:  Current Outpatient Medications   Medication Sig Dispense Refill     magnesium citrate solution Take by mouth once 200mg tab 2 tab. daily       NATURE-THROID 32.5 MG TABS        Prenatal Vit-Fe Fumarate-FA (PRENATAL MULTIVITAMIN PLUS IRON) 27-0.8 MG TABS per tablet Take 1 tablet by mouth daily        polyethylene glycol (MIRALAX) powder 2-3 servings a day for 3-4 days, then change to 1 serving daily. (Patient not taking: Reported on 7/30/2019) 510 g 5     triamcinolone (KENALOG) 0.1 % cream Apply sparingly to affected area three times daily until rash resolves. (Patient not taking: Reported on 4/2/2018) 453.6 g 5       PAST SURGICAL HISTORY:  Past Surgical History:   Procedure Laterality Date     BACK SURGERY  2007    L1 compression fracture, no surgery     BREAST SURGERY  2014    elective augmentation     COSMETIC SURGERY      breast augmentation       ALLERGIES:   No Known Allergies    FAMILY HISTORY:  Family History   Problem Relation Age of Onset     Alcohol/Drug Father      Depression Father      Alcohol/Drug Maternal Grandmother      Kidney Disease Maternal Grandmother      Depression Maternal Grandmother      Lipids Maternal Grandmother      Endocrine Disease Maternal Grandmother      Heart Disease Maternal Grandmother      Coronary Artery Disease Maternal Grandmother      Hypertension Maternal Grandmother      Cerebrovascular Disease Maternal Grandmother      Thyroid Disease Maternal Grandmother      Known Genetic Syndrome Maternal Grandmother         polycystic kidney disease     Alcohol/Drug Maternal Grandfather      Depression Maternal Grandfather      Diabetes Maternal Grandfather      Hyperlipidemia Maternal Grandfather      Alcohol/Drug Paternal Grandfather      Depression Paternal Grandfather      Depression Paternal Grandmother      Kidney Disease Mother      Endocrine Disease Mother      Thyroid Disease Mother      Thyroid Disease Mother         hypothyroidism     Known Genetic Syndrome Mother         polycystic kidney disease     Cancer No family hx of      Glaucoma No family hx of      Macular Degeneration No family hx of        SOCIAL HISTORY:  Social History     Tobacco Use     Smoking status: Never Smoker     Smokeless tobacco: Never Used   Substance Use Topics     Alcohol use: Yes      "Comment: social use only     Drug use: No       ROS:   A comprehensive 10 point review of systems negative other than as mentioned in HPI.  Exam:  /73 (BP Location: Right arm, Patient Position: Chair, Cuff Size: Adult Large)   Pulse 65   Ht 1.676 m (5' 6\")   Wt 75.3 kg (165 lb 14.4 oz)   SpO2 100%   BMI 26.78 kg/m     GENERAL APPEARANCE: alert and no distress  HEENT: no icterus, no xanthelasmas, normal pupil size and reaction, normal palate, mucosa moist, no central cyanosis  NECK: no adenopathy, no asymmetry, masses, or scars, thyroid normal to palpation and no bruits, JVP not elevated  RESPIRATORY: lungs clear to auscultation - no rales, rhonchi or wheezes, no use of accessory muscles, no retractions, respirations are unlabored, normal respiratory rate  CARDIOVASCULAR: regular rhythm, normal S1 with physiologic split S2, no S3 or S4 and no murmur, click or rub, precordium quiet with normal PMI.  ABDOMEN: soft, non tender, bowel sounds normal, non-distended  EXTREMITIES: peripheral pulses normal, no edema  NEURO: alert and oriented to person/place/time, normal speech, gait and affect  SKIN: no ecchymoses, no rashes  PSYCH: normal affect, cooperative    Labs:  Reviewed.     Testing/Procedures:  PULMONARY FUNCTION TESTS:   No flowsheet data found.      ECHOCARDIOGRAM:       TTE 7/12/2019  Interpretation Summary     No structural source of arrhythmia is identified.  Left and right ventricular function, chamber size, wall motion, and wall  thickness are normal.The LVEF is 55-60%.  No significant valvular abnormalities were noted.  This study was compared with the study from 3/23/18: There has been no change.      cMRI 6/5/2014     IMPRESSION  IMPRESSION:   1.  Normal left ventricular size and systolic function with a  calculated ejection fraction of  61 %.  2.  Normal right ventricular size and systolic function with a  calculated ejection fraction of 58%.   3.  On delayed enhancement imaging, there is no " abnormal  hyperenhancement to suggest myocardial scar/inflammation/infiltration           Assessment and Plan:     Ms. De La Cruz is a 29 year old female with PMHx of PVC and hypothyroidism who presents today for referral for PVC ablation.    #Symptomatic PVCs  Patient presenting today with symptomatic and significant PVC burden. TTE revealed no depression of her cardiac function, however she is symptomatic with a 27% PVC burden. Based on her degree of burdens and symptoms, she is a candidate for PVC ablation.    1. Can start PO Metop 12.5mg BID as a bridge to ablation  2. Will set up for PVC ablation. Reviewed the risk and benefits of her procedure today    Maria Ines Butterfield MD  Cardiology Fellow     EP STAFF NOTE  Patient seen and examined and management plan personally reviewed with the patient. I agree with the note above by the CV/EP fellow.  The patient has a fairly frequent and symptomatic PVC with no LV dysfunction.  The PVC morphology on the EKG today is suggestive of a RCC origin, with possibility of RVOT septum or LCC.  We discussed with the patient medical therapy versus ablation therapy.  We discussed in detail the possible medications we could use.  We also described in detail the PVC ablation procedure.  The risks of the procedure include vascular complications and bleeding, pericardial effusion and tamponade, CVA, AV block with need for pacemaker, very low risk of death.  After discussion, the patient opted for PVC ablation to try to avoid long-term medical therapy.  Tony Castro MD [facility]  Cardiology - Electrophysiology

## 2019-10-23 NOTE — PATIENT INSTRUCTIONS
You are scheduled for a Premature Ventricular Contraction (PVC) ablation, at [facility], [place], with Dr. Castro. The hospital is located at [address] [place] [address] on the [place] of the [place].  If you need to cancel this procedure, please call [phone]. Please note the following schedule below:    PVC Ablation    Date:__November 26, 2019____Time: ___10:00 am_____To the [facility] Waiting Room at the [facility]      1. Your history and physical will be completed by our nurse practitioner when you arrive.    2. Please do not eat anything for 8 hours prior to your procedure. You may have sips of water up until 2 hours prior to your arrival.    3. The morning of your procedure, you may take your scheduled medications with a sip of water.    4. You may discharge the same day. You will need a .        Post-Procedure Instructions  Care of groin site:    Remove the Band-Aid after 24 hours. If there is minor oozing, apply another Band-aid and remove it after 12 hours.     Do NOT take a bath, use a hot tub, pool, or submerse in water for at least 3 days. You may shower.     It is normal to have a small bruise or lump at the site.    Do not scrub the site.    Do not use lotion or powder near the puncture site for 3 days.    If you start bleeding from the site in your groin: Lie down flat and press firmly on the site. Call your physician immediately, or, come to the emergency room.  Call 911 right away if you have bleeding that is heavy or does not stop.    Call your doctor/provider if:     You have a large or growing hard lump around the site.     The site is red, swollen, hot or tender.     Blood or fluid is draining from the site.     You have chills or a fever greater than 101 F (38 C).     Your leg or arm turns bluish, feels numb or cool.     You have hives, a rash or unusual itching.      Activity Restrictions    For the first 2 days: Do not stoop or squat. When you cough, sneeze or  move your bowels, hold your hand over the puncture site and press gently.    Do not lift more than 10 pounds or exertional activity for 10 days.  - No driving for 24 hours after (with or without general anesthesia).          Date: _Feb. 26, 2020__9:30 am____ Follow up appointment      Please do not hesitate to utilize [employer] or call us if you have any questions or concerns.    Verena Joy RN  Electrophysiology Nurse Coordinator  [phone]    Zayn[facility] LEGER Procedure   [phone]

## 2019-10-23 NOTE — NURSING NOTE
Vitals completed successfully and medication reconciled.     Rosibel Kraft CMA  9:23 AM  Chief Complaint   Patient presents with     New Patient     PVC ablation consult. Referral: Dr Briceno.

## 2019-10-23 NOTE — PROGRESS NOTES
Electrophysiology Clinic Note  HPI:     Ms. De La Cruz is a 29 year old female with PMHx of PVC and hypothyroidism who presents today for referral for PVC ablation.    Patient has a significant history of PVCs. She underwent Holter Monitor in 2014 due to symptoms of SOB and palpitations. Holter Monitor revealed a PVC burden of 9.6% with two patient triggered events related to her PVCs. She underwent CMRI in 2014 that revealed no significant structural heart disease. At the time, she deferred medical management. Recently, patient developed increasing episodes of palpations and nausea. She reached out to Dr. Briceno's clinic due to concern for increasing PVC frequency. A zio patch was ordered which revealed 27% PVC burden and episodes of dizziness and nausea associated with PVCs. She was then referred to EP clinic for further management.    During her presentation to clinic today, she endorsed nausea and dizziness throughout the month. She denied palpitations, lightheadedness, SOB, PND and orthopnea. EKG: PVCs in bigeminy Medications: Triamcinolone, Miralax.       PAST MEDICAL HISTORY:  Past Medical History:   Diagnosis Date     Acne vulgaris      Coronary artery disease     PVCs-asymptomatic. not on any meds for this     Goiter, unspecified 3/17/2015     Hashimoto's thyroiditis 12/21/2016       CURRENT MEDICATIONS:  Current Outpatient Medications   Medication Sig Dispense Refill     magnesium citrate solution Take by mouth once 200mg tab 2 tab. daily       NATURE-THROID 32.5 MG TABS        Prenatal Vit-Fe Fumarate-FA (PRENATAL MULTIVITAMIN PLUS IRON) 27-0.8 MG TABS per tablet Take 1 tablet by mouth daily       polyethylene glycol (MIRALAX) powder 2-3 servings a day for 3-4 days, then change to 1 serving daily. (Patient not taking: Reported on 7/30/2019) 510 g 5     triamcinolone (KENALOG) 0.1 % cream Apply sparingly to affected area three times daily until rash resolves. (Patient not taking: Reported on 4/2/2018) 453.6 g 5  "      PAST SURGICAL HISTORY:  Past Surgical History:   Procedure Laterality Date     BACK SURGERY  2007    L1 compression fracture, no surgery     BREAST SURGERY  2014    elective augmentation     COSMETIC SURGERY      breast augmentation       ALLERGIES:   No Known Allergies    FAMILY HISTORY:  Family History   Problem Relation Age of Onset     Alcohol/Drug Father      Depression Father      Alcohol/Drug Maternal Grandmother      Kidney Disease Maternal Grandmother      Depression Maternal Grandmother      Lipids Maternal Grandmother      Endocrine Disease Maternal Grandmother      Heart Disease Maternal Grandmother      Coronary Artery Disease Maternal Grandmother      Hypertension Maternal Grandmother      Cerebrovascular Disease Maternal Grandmother      Thyroid Disease Maternal Grandmother      Known Genetic Syndrome Maternal Grandmother         polycystic kidney disease     Alcohol/Drug Maternal Grandfather      Depression Maternal Grandfather      Diabetes Maternal Grandfather      Hyperlipidemia Maternal Grandfather      Alcohol/Drug Paternal Grandfather      Depression Paternal Grandfather      Depression Paternal Grandmother      Kidney Disease Mother      Endocrine Disease Mother      Thyroid Disease Mother      Thyroid Disease Mother         hypothyroidism     Known Genetic Syndrome Mother         polycystic kidney disease     Cancer No family hx of      Glaucoma No family hx of      Macular Degeneration No family hx of        SOCIAL HISTORY:  Social History     Tobacco Use     Smoking status: Never Smoker     Smokeless tobacco: Never Used   Substance Use Topics     Alcohol use: Yes     Comment: social use only     Drug use: No       ROS:   A comprehensive 10 point review of systems negative other than as mentioned in HPI.  Exam:  /73 (BP Location: Right arm, Patient Position: Chair, Cuff Size: Adult Large)   Pulse 65   Ht 1.676 m (5' 6\")   Wt 75.3 kg (165 lb 14.4 oz)   SpO2 100%   BMI 26.78 " kg/m    GENERAL APPEARANCE: alert and no distress  HEENT: no icterus, no xanthelasmas, normal pupil size and reaction, normal palate, mucosa moist, no central cyanosis  NECK: no adenopathy, no asymmetry, masses, or scars, thyroid normal to palpation and no bruits, JVP not elevated  RESPIRATORY: lungs clear to auscultation - no rales, rhonchi or wheezes, no use of accessory muscles, no retractions, respirations are unlabored, normal respiratory rate  CARDIOVASCULAR: regular rhythm, normal S1 with physiologic split S2, no S3 or S4 and no murmur, click or rub, precordium quiet with normal PMI.  ABDOMEN: soft, non tender, bowel sounds normal, non-distended  EXTREMITIES: peripheral pulses normal, no edema  NEURO: alert and oriented to person/place/time, normal speech, gait and affect  SKIN: no ecchymoses, no rashes  PSYCH: normal affect, cooperative    Labs:  Reviewed.     Testing/Procedures:  PULMONARY FUNCTION TESTS:   No flowsheet data found.      ECHOCARDIOGRAM:       TTE 7/12/2019  Interpretation Summary     No structural source of arrhythmia is identified.  Left and right ventricular function, chamber size, wall motion, and wall  thickness are normal.The LVEF is 55-60%.  No significant valvular abnormalities were noted.  This study was compared with the study from 3/23/18: There has been no change.      cMRI 6/5/2014     IMPRESSION  IMPRESSION:   1.  Normal left ventricular size and systolic function with a  calculated ejection fraction of  61 %.  2.  Normal right ventricular size and systolic function with a  calculated ejection fraction of 58%.   3.  On delayed enhancement imaging, there is no abnormal  hyperenhancement to suggest myocardial scar/inflammation/infiltration           Assessment and Plan:     Ms. De La Cruz is a 29 year old female with PMHx of PVC and hypothyroidism who presents today for referral for PVC ablation.    #Symptomatic PVCs  Patient presenting today with symptomatic and significant PVC  burden. TTE revealed no depression of her cardiac function, however she is symptomatic with a 27% PVC burden. Based on her degree of burdens and symptoms, she is a candidate for PVC ablation.    1. Can start PO Metop 12.5mg BID as a bridge to ablation  2. Will set up for PVC ablation. Reviewed the risk and benefits of her procedure today    Maria Ines Butterfield MD  Cardiology Fellow     EP STAFF NOTE  Patient seen and examined and management plan personally reviewed with the patient. I agree with the note above by the CV/EP fellow.  The patient has a fairly frequent and symptomatic PVC with no LV dysfunction.  The PVC morphology on the EKG today is suggestive of a RCC origin, with possibility of RVOT septum or LCC.  We discussed with the patient medical therapy versus ablation therapy.  We discussed in detail the possible medications we could use.  We also described in detail the PVC ablation procedure.  The risks of the procedure include vascular complications and bleeding, pericardial effusion and tamponade, CVA, AV block with need for pacemaker, very low risk of death.  After discussion, the patient opted for PVC ablation to try to avoid long-term medical therapy.  Tony Castro MD [facility]  Cardiology - Electrophysiology

## 2019-11-26 ENCOUNTER — HOSPITAL ENCOUNTER (OUTPATIENT)
Facility: CLINIC | Age: 29
Discharge: HOME | End: 2019-11-26
Attending: INTERNAL MEDICINE | Admitting: INTERNAL MEDICINE
Payer: COMMERCIAL

## 2019-11-26 ENCOUNTER — APPOINTMENT (OUTPATIENT)
Dept: LAB | Facility: CLINIC | Age: 29
End: 2019-11-26
Attending: INTERNAL MEDICINE
Payer: COMMERCIAL

## 2019-11-26 ENCOUNTER — APPOINTMENT (OUTPATIENT)
Dept: MEDSURG UNIT | Facility: CLINIC | Age: 29
End: 2019-11-26
Attending: INTERNAL MEDICINE
Payer: COMMERCIAL

## 2019-11-26 ENCOUNTER — APPOINTMENT (OUTPATIENT)
Dept: CARDIOLOGY | Facility: CLINIC | Age: 29
End: 2019-11-26
Attending: NURSE PRACTITIONER
Payer: COMMERCIAL

## 2019-11-26 VITALS
SYSTOLIC BLOOD PRESSURE: 107 MMHG | OXYGEN SATURATION: 99 % | RESPIRATION RATE: 16 BRPM | TEMPERATURE: 98 F | HEART RATE: 58 BPM | DIASTOLIC BLOOD PRESSURE: 54 MMHG

## 2019-11-26 DIAGNOSIS — I49.3 PVC'S (PREMATURE VENTRICULAR CONTRACTIONS): ICD-10-CM

## 2019-11-26 LAB
ANION GAP SERPL CALCULATED.3IONS-SCNC: 6 MMOL/L (ref 3–14)
B-HCG SERPL-ACNC: <1 IU/L (ref 0–5)
BUN SERPL-MCNC: 9 MG/DL (ref 7–30)
CALCIUM SERPL-MCNC: 9 MG/DL (ref 8.5–10.1)
CHLORIDE SERPL-SCNC: 108 MMOL/L (ref 94–109)
CO2 SERPL-SCNC: 24 MMOL/L (ref 20–32)
CREAT SERPL-MCNC: 0.69 MG/DL (ref 0.52–1.04)
ERYTHROCYTE [DISTWIDTH] IN BLOOD BY AUTOMATED COUNT: 11.7 % (ref 10–15)
GFR SERPL CREATININE-BSD FRML MDRD: >90 ML/MIN/{1.73_M2}
GLUCOSE SERPL-MCNC: 91 MG/DL (ref 70–99)
HCT VFR BLD AUTO: 39.9 % (ref 35–47)
HGB BLD-MCNC: 13.8 G/DL (ref 11.7–15.7)
MCH RBC QN AUTO: 30.9 PG (ref 26.5–33)
MCHC RBC AUTO-ENTMCNC: 34.6 G/DL (ref 31.5–36.5)
MCV RBC AUTO: 90 FL (ref 78–100)
PLATELET # BLD AUTO: 220 10E9/L (ref 150–450)
POTASSIUM SERPL-SCNC: 4.2 MMOL/L (ref 3.4–5.3)
RBC # BLD AUTO: 4.46 10E12/L (ref 3.8–5.2)
SODIUM SERPL-SCNC: 138 MMOL/L (ref 133–144)
TSH SERPL DL<=0.005 MIU/L-ACNC: 3.33 MU/L (ref 0.4–4)
WBC # BLD AUTO: 6 10E9/L (ref 4–11)

## 2019-11-26 PROCEDURE — 99152 MOD SED SAME PHYS/QHP 5/>YRS: CPT | Performed by: INTERNAL MEDICINE

## 2019-11-26 PROCEDURE — 25800030 ZZH RX IP 258 OP 636: Performed by: NURSE PRACTITIONER

## 2019-11-26 PROCEDURE — 27210794 ZZH OR GENERAL SUPPLY STERILE: Performed by: INTERNAL MEDICINE

## 2019-11-26 PROCEDURE — 0298T ZIO PATCH HOLTER ADULT PEDIATRIC GREATER THAN 48 HRS: CPT | Mod: 59 | Performed by: INTERNAL MEDICINE

## 2019-11-26 PROCEDURE — 25000128 H RX IP 250 OP 636: Performed by: INTERNAL MEDICINE

## 2019-11-26 PROCEDURE — 36415 COLL VENOUS BLD VENIPUNCTURE: CPT | Performed by: INTERNAL MEDICINE

## 2019-11-26 PROCEDURE — C1730 CATH, EP, 19 OR FEW ELECT: HCPCS | Performed by: INTERNAL MEDICINE

## 2019-11-26 PROCEDURE — 25000125 ZZHC RX 250: Performed by: INTERNAL MEDICINE

## 2019-11-26 PROCEDURE — 80048 BASIC METABOLIC PNL TOTAL CA: CPT | Performed by: INTERNAL MEDICINE

## 2019-11-26 PROCEDURE — 99153 MOD SED SAME PHYS/QHP EA: CPT | Performed by: INTERNAL MEDICINE

## 2019-11-26 PROCEDURE — 93010 ELECTROCARDIOGRAM REPORT: CPT | Mod: 76 | Performed by: INTERNAL MEDICINE

## 2019-11-26 PROCEDURE — 93005 ELECTROCARDIOGRAM TRACING: CPT

## 2019-11-26 PROCEDURE — 93623 PRGRMD STIMJ&PACG IV RX NFS: CPT | Mod: 26 | Performed by: INTERNAL MEDICINE

## 2019-11-26 PROCEDURE — C1887 CATHETER, GUIDING: HCPCS | Performed by: INTERNAL MEDICINE

## 2019-11-26 PROCEDURE — 84702 CHORIONIC GONADOTROPIN TEST: CPT | Performed by: INTERNAL MEDICINE

## 2019-11-26 PROCEDURE — 85027 COMPLETE CBC AUTOMATED: CPT | Performed by: INTERNAL MEDICINE

## 2019-11-26 PROCEDURE — 93620 COMP EP EVL R AT VEN PAC&REC: CPT | Mod: 26 | Performed by: INTERNAL MEDICINE

## 2019-11-26 PROCEDURE — 25000125 ZZHC RX 250: Performed by: NURSE PRACTITIONER

## 2019-11-26 PROCEDURE — 40000065 ZZH STATISTIC EKG NON-CHARGEABLE

## 2019-11-26 PROCEDURE — 0296T ZIO PATCH HOLTER ADULT PEDIATRIC GREATER THAN 48 HRS: CPT

## 2019-11-26 PROCEDURE — 40000172 ZZH STATISTIC PROCEDURE PREP ONLY

## 2019-11-26 PROCEDURE — 84443 ASSAY THYROID STIM HORMONE: CPT | Performed by: INTERNAL MEDICINE

## 2019-11-26 PROCEDURE — 93620 COMP EP EVL R AT VEN PAC&REC: CPT | Performed by: INTERNAL MEDICINE

## 2019-11-26 PROCEDURE — C1894 INTRO/SHEATH, NON-LASER: HCPCS | Performed by: INTERNAL MEDICINE

## 2019-11-26 RX ORDER — FENTANYL CITRATE 50 UG/ML
INJECTION, SOLUTION INTRAMUSCULAR; INTRAVENOUS
Status: DISCONTINUED | OUTPATIENT
Start: 2019-11-26 | End: 2019-11-26 | Stop reason: HOSPADM

## 2019-11-26 RX ORDER — FENTANYL CITRATE 50 UG/ML
25 INJECTION, SOLUTION INTRAMUSCULAR; INTRAVENOUS EVERY 5 MIN PRN
Status: DISCONTINUED | OUTPATIENT
Start: 2019-11-26 | End: 2019-11-26 | Stop reason: HOSPADM

## 2019-11-26 RX ORDER — CEFAZOLIN SODIUM 1 G/3ML
1 INJECTION, POWDER, FOR SOLUTION INTRAMUSCULAR; INTRAVENOUS
Status: DISCONTINUED | OUTPATIENT
Start: 2019-11-26 | End: 2019-11-26 | Stop reason: HOSPADM

## 2019-11-26 RX ORDER — DOBUTAMINE HYDROCHLORIDE 200 MG/100ML
5-40 INJECTION INTRAVENOUS CONTINUOUS PRN
Status: DISCONTINUED | OUTPATIENT
Start: 2019-11-26 | End: 2019-11-26 | Stop reason: HOSPADM

## 2019-11-26 RX ORDER — PHENYLEPHRINE HYDROCHLORIDE 10 MG/ML
INJECTION INTRAVENOUS
Status: DISCONTINUED | OUTPATIENT
Start: 2019-11-26 | End: 2019-11-26 | Stop reason: HOSPADM

## 2019-11-26 RX ORDER — SODIUM CHLORIDE 9 MG/ML
INJECTION, SOLUTION INTRAVENOUS CONTINUOUS
Status: DISCONTINUED | OUTPATIENT
Start: 2019-11-26 | End: 2019-11-26 | Stop reason: HOSPADM

## 2019-11-26 RX ORDER — ONDANSETRON 2 MG/ML
INJECTION INTRAMUSCULAR; INTRAVENOUS
Status: DISCONTINUED | OUTPATIENT
Start: 2019-11-26 | End: 2019-11-26 | Stop reason: HOSPADM

## 2019-11-26 RX ORDER — METOPROLOL TARTRATE 1 MG/ML
INJECTION, SOLUTION INTRAVENOUS
Status: DISCONTINUED | OUTPATIENT
Start: 2019-11-26 | End: 2019-11-26 | Stop reason: HOSPADM

## 2019-11-26 RX ORDER — LIDOCAINE 40 MG/G
CREAM TOPICAL
Status: DISCONTINUED | OUTPATIENT
Start: 2019-11-26 | End: 2019-11-26 | Stop reason: HOSPADM

## 2019-11-26 RX ORDER — NALOXONE HYDROCHLORIDE 0.4 MG/ML
.1-.4 INJECTION, SOLUTION INTRAMUSCULAR; INTRAVENOUS; SUBCUTANEOUS
Status: DISCONTINUED | OUTPATIENT
Start: 2019-11-26 | End: 2019-11-27 | Stop reason: HOSPADM

## 2019-11-26 RX ORDER — OXYCODONE AND ACETAMINOPHEN 5; 325 MG/1; MG/1
1 TABLET ORAL EVERY 4 HOURS PRN
Status: DISCONTINUED | OUTPATIENT
Start: 2019-11-26 | End: 2019-11-27 | Stop reason: HOSPADM

## 2019-11-26 RX ORDER — CAFFEINE AND SODIUM BENZOATE 125 MG/ML
500 INJECTION, SOLUTION INTRAMUSCULAR; INTRAVENOUS ONCE
Status: COMPLETED | OUTPATIENT
Start: 2019-11-26 | End: 2019-11-26

## 2019-11-26 RX ORDER — DIPHENHYDRAMINE HYDROCHLORIDE 50 MG/ML
INJECTION INTRAMUSCULAR; INTRAVENOUS
Status: DISCONTINUED | OUTPATIENT
Start: 2019-11-26 | End: 2019-11-26 | Stop reason: HOSPADM

## 2019-11-26 RX ORDER — FENTANYL CITRATE 50 UG/ML
50 INJECTION, SOLUTION INTRAMUSCULAR; INTRAVENOUS
Status: DISCONTINUED | OUTPATIENT
Start: 2019-11-26 | End: 2019-11-26 | Stop reason: HOSPADM

## 2019-11-26 RX ORDER — CALCIUM GLUCONATE 94 MG/ML
INJECTION, SOLUTION INTRAVENOUS CONTINUOUS PRN
Status: COMPLETED | OUTPATIENT
Start: 2019-11-26 | End: 2019-11-26

## 2019-11-26 RX ADMIN — CAFFEINE AND SODIUM BENZOATE 250 MG: 125 INJECTION, SOLUTION INTRAMUSCULAR; INTRAVENOUS at 17:45

## 2019-11-26 RX ADMIN — CAFFEINE AND SODIUM BENZOATE 250 MG: 125 INJECTION, SOLUTION INTRAMUSCULAR; INTRAVENOUS at 17:54

## 2019-11-26 RX ADMIN — CAFFEINE AND SODIUM BENZOATE 250 MG: 125 INJECTION, SOLUTION INTRAMUSCULAR; INTRAVENOUS at 18:00

## 2019-11-26 NOTE — PRE-PROCEDURE
GENERAL PRE-PROCEDURE:   Procedure:  Premature ventricular contraction (PVC) ablation  Date/Time:  11/26/2019 12:04 PM    Verbal consent obtained?: Yes    Written consent obtained?: Yes    Risks and benefits: Risks, benefits and alternatives were discussed    DC Plan: Appropriate discharge home plan in place for patients who are going home after procedure   Consent given by:  Patient  Patient states understanding of procedure being performed: Yes    Patient's understanding of procedure matches consent: Yes    Procedure consent matches procedure scheduled: Yes    Expected level of sedation:  Moderate  Appropriately NPO:  Yes  ASA Class:  Class 2- mild systemic disease, no acute problems, no functional limitations  Mallampati  :  Grade 2- soft palate, base of uvula, tonsillar pillars, and portion of posterior pharyngeal wall visible  Lungs:  Lungs clear with good breath sounds bilaterally  Heart:  Normal heart sounds and rate and other (comment)  Heart exam comment:  Ectopy present  History & Physical reviewed:  History and physical reviewed and no updates needed  Statement of review:  I have reviewed the lab findings, diagnostic data, medications, and the plan for sedation

## 2019-11-26 NOTE — PROGRESS NOTES
Patient arrived on 2A for PVC ablation.  Labs reviewed.  IV placed.  Awaiting consent and EKG, otherwise prep complete.

## 2019-11-26 NOTE — H&P
Pre-procedural H&P    Procedure:  PVC ablation    HPI:  28 yo female with frequent symptomatic PVCs, 27% burden on a 48 hr holter monitor, in the setting of preserved biventricular systolic function and no delayed enhancement on MRI, who has elected to undergo EP study and ablation over lifelong pharmacologic therapy.  She has hypothyroidism and is on replacement therapy.    She reports feeling generally well this morning.  She denies worsening chest pain, dyspnea, or lightheadedness.  She denies orthopnea and peripheral edema.  She denies recent fever/chills.    Today's EKG shows sinus rhythm.  Prior EKGs have captured ventricular bigeminy with monomorphic PVCs: LBBB pattern, positive from V3 onwards precordially, inferior axis, equally negative in aVL and AVR, slightly negative in lead I - most likely RCC or LCC.    Past Medical History:   Diagnosis Date     Acne vulgaris      Coronary artery disease     PVCs-asymptomatic. not on any meds for this     Goiter, unspecified 3/17/2015     Hashimoto's thyroiditis 12/21/2016     No current facility-administered medications on file prior to encounter.   magnesium citrate solution, Take by mouth once 200mg tab 2 tab. daily  NATURE-THROID 32.5 MG TABS,   polyethylene glycol (MIRALAX) powder, 2-3 servings a day for 3-4 days, then change to 1 serving daily. (Patient not taking: Reported on 7/30/2019)  Prenatal Vit-Fe Fumarate-FA (PRENATAL MULTIVITAMIN PLUS IRON) 27-0.8 MG TABS per tablet, Take 1 tablet by mouth daily  triamcinolone (KENALOG) 0.1 % cream, Apply sparingly to affected area three times daily until rash resolves. (Patient not taking: Reported on 4/2/2018)       No Known Allergies  Past Surgical History:   Procedure Laterality Date     BACK SURGERY  2007    L1 compression fracture, no surgery     BREAST SURGERY  2014    elective augmentation     COSMETIC SURGERY      breast augmentation     Family History   Problem Relation Age of Onset     Alcohol/Drug Father       Depression Father      Alcohol/Drug Maternal Grandmother      Kidney Disease Maternal Grandmother      Depression Maternal Grandmother      Lipids Maternal Grandmother      Endocrine Disease Maternal Grandmother      Heart Disease Maternal Grandmother      Coronary Artery Disease Maternal Grandmother      Hypertension Maternal Grandmother      Cerebrovascular Disease Maternal Grandmother      Thyroid Disease Maternal Grandmother      Known Genetic Syndrome Maternal Grandmother         polycystic kidney disease     Alcohol/Drug Maternal Grandfather      Depression Maternal Grandfather      Diabetes Maternal Grandfather      Hyperlipidemia Maternal Grandfather      Alcohol/Drug Paternal Grandfather      Depression Paternal Grandfather      Depression Paternal Grandmother      Kidney Disease Mother      Endocrine Disease Mother      Thyroid Disease Mother      Thyroid Disease Mother         hypothyroidism     Known Genetic Syndrome Mother         polycystic kidney disease     Cancer No family hx of      Glaucoma No family hx of      Macular Degeneration No family hx of      Social History     Socioeconomic History     Marital status:      Spouse name: Not on file     Number of children: Not on file     Years of education: Not on file     Highest education level: Not on file   Occupational History     Occupation: RN     Employer: [employer]UD[employer]     Employer: [employer],[address]   Social Needs     Financial resource strain: Not on file     Food insecurity:     Worry: Not on file     Inability: Not on file     Transportation needs:     Medical: Not on file     Non-medical: Not on file   Tobacco Use     Smoking status: Never Smoker     Smokeless tobacco: Never Used   Substance and Sexual Activity     Alcohol use: Yes     Comment: social use only     Drug use: No     Sexual activity: Yes     Partners: Male     Birth control/protection: Pill   Lifestyle     Physical activity:     Days per week:  Not on file     Minutes per session: Not on file     Stress: Not on file   Relationships     Social connections:     Talks on phone: Not on file     Gets together: Not on file     Attends Islam service: Not on file     Active member of club or organization: Not on file     Attends meetings of clubs or organizations: Not on file     Relationship status: Not on file     Intimate partner violence:     Fear of current or ex partner: Not on file     Emotionally abused: Not on file     Physically abused: Not on file     Forced sexual activity: Not on file   Other Topics Concern     Parent/sibling w/ CABG, MI or angioplasty before 65F 55M? No   Social History Narrative    Caffeine intake/servings daily - 0-1    Calcium intake/servings daily - 2    Exercise 2 times weekly - describe cardio, strength    Sunscreen used - Yes    Seatbelts used - Yes    Guns stored in the home - No    Self Breast Exam - No    Pap test up to date -  Yes    Eye exam up to date -  No    Dental exam up to date -  Yes    DEXA scan up to date -  Not Applicable    Flex Sig/Colonoscopy up to date -  Not Applicable    Mammography up to date -  Not Applicable    Immunizations reviewed and up to date - ?    Abuse: Current or Past (Physical, Sexual or Emotional) - No    Do you feel safe in your environment - Yes    Do you cope well with stress - Yes    Do you suffer from insomnia - No    Last updated by: Francesca Blake  12/15/2008    Francesca Blake M.A.                       /68 (BP Location: Right arm)   Pulse 67   Temp 98.4  F (36.9  C) (Oral)   Resp 18   SpO2 100%   General appearance:  Comfortable appearing female with unlabored breathing at rest  Neuro:  Alert & fully oriented, fluent speech  Eyes:  Anicteric sclerae, no xanthalesmas  Neck:  JVP is not elevated.  CV:  RRR with normal s1 and s2.  Warm extremities.  No peripheral edema.  Lungs:  Clear on ascultation bilaterally.  Abdomen:  Soft.  Not tender.  Skin:  No peripheral  petechiae/ecchymoses.  Not jaundiced.    Relevant labs, imaging, and procedures were reviewed.  Recent Labs   Lab Test 11/26/19  1036  03/17/15  1511 03/04/14  1524 04/10/13  1040   CR 0.69  --  0.74 0.83 0.77   BUN 9  --  15 14 11   POTASSIUM 4.2  --  3.6 3.9 4.1   HGB 13.8   < >  --  13.8 14.5      < >  --  253 231    < > = values in this interval not displayed.         Impression:  30 yo F with frequent symptomatic PVCs likely originating from the LCC or RCC.    Plan:  Proceed with EP study and ablation under moderate anesthesia as planned.    Will discuss patient with Dr. Tony Castro.    Pop Milan MD  Cardiovascular disease fellow

## 2019-11-26 NOTE — Clinical Note
Potential access sites were evaluated for patency using ultrasound.   The right femoral vein was selected. Access was obtained with Sonosite and Fluoroscopic guidance using a micropuncture 21 gauge needle with direct visualization of needle entry.

## 2019-11-27 LAB
INTERPRETATION ECG - MUSE: NORMAL
INTERPRETATION ECG - MUSE: NORMAL

## 2019-11-27 NOTE — PROGRESS NOTES
Patient off bedrest at 2030. Ambulated in the hallway and started bleeding. Manual pressure held for 10 minutes and the bleeding stopped.

## 2019-11-27 NOTE — DISCHARGE SUMMARY
Discharge instructions reviewed, understood, and signed by patient. VSS, PIV removed, patient has all belongings. Patient ambulated off unit with family.

## 2019-11-27 NOTE — PROVIDER NOTIFICATION
Provider updated on pt site bleeding. Order to restart bedrest for 2 hours. Will be off bedrest at 2240.

## 2019-12-02 ENCOUNTER — PATIENT OUTREACH (OUTPATIENT)
Dept: CARDIOLOGY | Facility: CLINIC | Age: 29
End: 2019-12-02

## 2019-12-02 DIAGNOSIS — I49.3 PVC (PREMATURE VENTRICULAR CONTRACTION): Primary | ICD-10-CM

## 2019-12-02 NOTE — PROGRESS NOTES
This note is a hospital discharge follow up phone call from the patient's PVC ablation (noninducible) procedure on 11/26/19 with Dr. Castro.    EP s/p Ablation  Questions Answers    1. Groin Sites Bruised, but look and feel fine.   2. New discharge medications NA   3. Any other discharge instructions Verbalized understanding of post procedure restrictions and when to call cardiology clinic.   4. Follow up appointments - Ziopatch placed immediately after ablation  - 3 mo follow-up with Jenna Jules NP scheduled.   5. Other questions/Concerns Patient states she had her progesterone-only IUD removed 1 week prior to ablation (noninducible). She requested this information be relayed to Dr. Castro as he told her that her PVCs could be related to her where she is at in her cycle.

## 2019-12-26 ENCOUNTER — MEDICAL CORRESPONDENCE (OUTPATIENT)
Dept: HEALTH INFORMATION MANAGEMENT | Facility: CLINIC | Age: 29
End: 2019-12-26

## 2019-12-26 DIAGNOSIS — R94.6 NONSPECIFIC ABNORMAL RESULTS OF THYROID FUNCTION STUDY: Primary | ICD-10-CM

## 2019-12-30 DIAGNOSIS — R94.6 NONSPECIFIC ABNORMAL RESULTS OF THYROID FUNCTION STUDY: ICD-10-CM

## 2019-12-30 LAB
T4 FREE SERPL-MCNC: 0.83 NG/DL (ref 0.76–1.46)
TSH SERPL DL<=0.005 MIU/L-ACNC: 0.96 MU/L (ref 0.4–4)

## 2019-12-30 PROCEDURE — 84443 ASSAY THYROID STIM HORMONE: CPT | Performed by: OBSTETRICS & GYNECOLOGY

## 2019-12-30 PROCEDURE — 36415 COLL VENOUS BLD VENIPUNCTURE: CPT | Performed by: OBSTETRICS & GYNECOLOGY

## 2019-12-30 PROCEDURE — 84439 ASSAY OF FREE THYROXINE: CPT | Performed by: OBSTETRICS & GYNECOLOGY

## 2020-01-08 ENCOUNTER — OFFICE VISIT (OUTPATIENT)
Dept: FAMILY MEDICINE | Facility: CLINIC | Age: 30
End: 2020-01-08
Payer: COMMERCIAL

## 2020-01-08 VITALS
RESPIRATION RATE: 16 BRPM | DIASTOLIC BLOOD PRESSURE: 74 MMHG | OXYGEN SATURATION: 100 % | WEIGHT: 158.8 LBS | TEMPERATURE: 98 F | HEART RATE: 71 BPM | BODY MASS INDEX: 25.52 KG/M2 | SYSTOLIC BLOOD PRESSURE: 110 MMHG | HEIGHT: 66 IN

## 2020-01-08 DIAGNOSIS — J06.9 UPPER RESPIRATORY TRACT INFECTION, UNSPECIFIED TYPE: Primary | ICD-10-CM

## 2020-01-08 DIAGNOSIS — R59.0 CERVICAL ADENOPATHY: ICD-10-CM

## 2020-01-08 PROCEDURE — 99203 OFFICE O/P NEW LOW 30 MIN: CPT | Performed by: PHYSICIAN ASSISTANT

## 2020-01-08 ASSESSMENT — MIFFLIN-ST. JEOR: SCORE: 1452.06

## 2020-01-08 ASSESSMENT — PAIN SCALES - GENERAL: PAINLEVEL: MILD PAIN (3)

## 2020-01-08 NOTE — PATIENT INSTRUCTIONS
Trial over the counter symptomatic relief, rest, and drink plenty of fluids. Salt water gargles and nasal lavage may also be helpful.  Return to clinic or Emergency Department for breathing/swallowing problems, fever >105, altered mental status, or worsening general condition.      Viral Respiratory Illness:  You have an Upper Respiratory Illness (URI) caused by a virus. This illness is contagious during the first few days. It is spread through the air by coughing and sneezing or by direct contact (touching the sick person and then touching your own eyes, nose or mouth). Most viral illnesses go away within 7-10 days with rest and simple home remedies. Sometimes, the illness may last for several weeks. Antibiotics will not kill a virus and are generally not prescribed for this condition.  Home Care:  1) If symptoms are severe, rest at home for the first 2-3 days. When you resume activity, don't let yourself get too tired.  2) Avoid being exposed to cigarette smoke (yours or others ).  3) Tylenol (acetaminophen) or ibuprofen (Advil, Motrin) will help fever, muscle aching and headache. (Persons under 18 with fever should not take aspirin since this may cause liver damage.)  4) Your appetite may be poor, so a light diet is fine. Avoid dehydration by drinking 6-8 glasses of fluids per day (water, soft, drinks, juices, tea, soup, etc.). Extra fluids will help loosen secretions in the nose and lungs.  5) Over-the-counter cold medicines will not shorten the length of time you re sick, but they may be helpful for the following symptoms: cough (Robitussin DM); sore throat (Chloraseptic lozenges or spray); nasal and sinus congestion (Actifed, Sudafed, Chlortrimeton).  Follow Up  with your doctor or as advised if you don t improve over the next week.  Get Prompt Medical Attention  if any of the following occur:  -- Cough with lots of colored sputum (mucus) or blood in your sputum  -- Chest pain, shortness of breath, wheezing or  have trouble breathing  -- Severe headache; face, neck or ear pain  -- Fever over 100.4  F (38.0  C) for more than three days  -- You can t swallow due to throat pain    [id number] [facility], [address] [place] [address], [place] [address]. All rights reserved. This information is not intended as a substitute for professional medical care. Always follow your healthcare professional's instructions.        Patient Education     Lymphadenopathy  Lymphadenopathy is swelling of the lymph nodes. Lymph nodes are small, bean-shaped glands around the body.  What are lymph nodes?  Lymph nodes are part of your immune system. These glands are found in your neck, over your clavicle, armpits, groin, chest, and abdomen. They act as filters for lymph fluid as it flows through your body. Lymph fluid contains white blood cells (lymphocytes) that help the body fight infection and disease.   Why lymph nodes swell  Lymphadenopathy is very common. The glands often enlarge during a viral or bacterial infection. It can happen during a cold, the flu, or strep throat. The nodes may swell in just one area of the body, such as the neck (localized). Or nodes may swell all over the body (generalized). The neck (cervical) lymph nodes are the most common site of lymphadenopathy.  What causes lymphadenopathy?  Dead cells and fluid build up in the lymph nodes as they help fight infection or disease. This causes them to swell in size. Enlarged lymph nodes are often near the source of infection. This can help to find the cause of an infection. For example, swollen lymph nodes around the jaw may be because of an infection in the teeth or mouth. But lymphadenopathy may also be generalized. This is common in some viral illnesses such as infectious mononucleosis, HIV or chickenpox (varicella).  Lymphadenopathy can also be caused by:    Infection of a lymph node or small group of nodes (lymphadenitis)    Cancer    Reactions to medicines such as antibiotics  and certain blood pressure, gout, and seizure medicines    Other health conditions, such as lupus or sarcoidosis  Symptoms of lymphadenopathy  Lymphadenopathy can cause symptoms such as:    Lumps under the jaw, on the sides or back of the neck, in the armpits, in the groin, or in the chest or belly (abdomen)    Pain or tenderness in any of these areas    Redness or warmth in any of these areas  You may also have symptoms from an infection causing the swollen glands. These symptoms may include fever, sore throat, body aches, or cough.  Diagnosing lymphadenopathy  Your healthcare provider will ask about your health history and symptoms. He or she will give you a physical exam and check the areas where lymph nodes are enlarged. Your healthcare provider will check the size. texture, and location of the nodes, and ask how long they have been swollen and if they are painful. Diagnostic tests and referral to specialists may be recommended. They may include:    Blood tests. These are done to check for signs of infection and other problems.    Urine test. This is also done to check for infection and other problems.    Chest X-ray, ultrasound, CT scan, or MRI scans. These tests can show enlarged lymph nodes or other problems.    Lymph node biopsy. The cause of enlarged lymph nodes may be checked with a biopsy. Small samples of lymph node tissue are taken and checked in a lab for signs of infection, cancer and other causes. You may be referred to other specialistsfor their opinion as well.  Treatment for lymphadenopathy  The treatment of enlarged lymph nodes depends on the cause. Enlarged lymph nodes are often harmless and go away without any treatment. Treatment is most often done on the cause of the enlarged nodes and may include:    Antibiotic or antiviral medicine to treat a bacterial or viral infection    Incision and drainage of a lymph node for lymphadenitis    Other medicines or procedures to treat the cause of the  enlarged nodes  You may need a follow-up exam in 3 to 4 weeks to recheck enlarged nodes.  When to call your healthcare provider  Call your healthcare provider if your symptoms worsen, you develop new symptoms, you have a fever of 100.4 F (38 C) or higher, lymph nodes that are still swollen after 3 to 4 weeks, or as directed by your healthcare provider.  Date Last Reviewed: 5/1/2017 2000-2019 [employer]. [address] [place] [address], [place] [address]. All rights reserved. This information is not intended as a substitute for professional medical care. Always follow your healthcare professional's instructions.

## 2020-01-08 NOTE — PROGRESS NOTES
Subjective     Ursula De La Cruz is a 29 year old female who presents to clinic today for the following health issues:    HPI   Acute Illness   Acute illness concerns: sore throat   Onset: 4 days     Fever: no    Chills/Sweats: YES    Headache (location?): no    Sinus Pressure:no    Conjunctivitis:  no    Ear Pain: no    Rhinorrhea: no    Congestion: YES    Sore Throat: YES-mild, but more the neck, swelling, and painful- right side      Cough: YES-productive of cloudy sputum    Wheeze: no    Decreased Appetite: no    Nausea: no    Vomiting: no    Diarrhea:  no    Dysuria/Freq.: no    Fatigue/Achiness: YES    Sick/Strep Exposure: YES- Been around stuff all the time      Therapies Tried and outcome: ibuprofen no relief      clinic [facility] in [place], PAP/HPV 9/2019 was normal.        No Known Allergies    Patient Active Problem List   Diagnosis     CARDIOVASCULAR SCREENING; LDL GOAL LESS THAN 160     Adjustment reaction     Acne     KP (keratosis pilaris)     History of compression fracture of spine     ADD (attention deficit disorder)     Family history of polycystic kidney disease     Goiter     Overweight (BMI 25.0-29.9)     Family history of thyroid disease     Aneurysm of cavernous portion of left internal carotid artery, 2.6mm     Hashimoto's thyroiditis     PVC's (premature ventricular contractions)     PVC (premature ventricular contraction)       Past Medical History:   Diagnosis Date     Acne vulgaris      Coronary artery disease     PVCs-asymptomatic. not on any meds for this     Goiter, unspecified 3/17/2015     Hashimoto's thyroiditis 12/21/2016       NATURE-THROID 32.5 MG TABS,   magnesium citrate solution, Take by mouth once 200mg tab 2 tab. daily  Prenatal Vit-Fe Fumarate-FA (PRENATAL MULTIVITAMIN PLUS IRON) 27-0.8 MG TABS per tablet, Take 1 tablet by mouth daily    No current facility-administered medications on file prior to visit.       Social History     Tobacco Use     Smoking status: Never  "Smoker     Smokeless tobacco: Never Used   Substance Use Topics     Alcohol use: Yes     Comment: social use only       Family History   Problem Relation Age of Onset     Alcohol/Drug Father      Depression Father      Alcohol/Drug Maternal Grandmother      Kidney Disease Maternal Grandmother      Depression Maternal Grandmother      Lipids Maternal Grandmother      Endocrine Disease Maternal Grandmother      Heart Disease Maternal Grandmother      Coronary Artery Disease Maternal Grandmother      Hypertension Maternal Grandmother      Cerebrovascular Disease Maternal Grandmother      Thyroid Disease Maternal Grandmother      Known Genetic Syndrome Maternal Grandmother         polycystic kidney disease     Alcohol/Drug Maternal Grandfather      Depression Maternal Grandfather      Diabetes Maternal Grandfather      Hyperlipidemia Maternal Grandfather      Alcohol/Drug Paternal Grandfather      Depression Paternal Grandfather      Depression Paternal Grandmother      Kidney Disease Mother      Endocrine Disease Mother      Thyroid Disease Mother      Thyroid Disease Mother         hypothyroidism     Known Genetic Syndrome Mother         polycystic kidney disease     Cancer No family hx of      Glaucoma No family hx of      Macular Degeneration No family hx of        ROS:  Consitutional: As above  ENT: As above  Respiratory: As above    OBJECTIVE:  /74 (BP Location: Left arm, Patient Position: Sitting, Cuff Size: Adult Large)   Pulse 71   Temp 98  F (36.7  C) (Oral)   Resp 16   Ht 1.676 m (5' 6\")   Wt 72 kg (158 lb 12.8 oz)   LMP 12/29/2019   SpO2 100%   Breastfeeding No   BMI 25.63 kg/m    GENERAL APPEARANCE: healthy, alert and no distress  EYES: conjunctiva clear  HENT:    TMs w/o erythema, effusion or bulging.  Nose and mouth without ulcers, erythema or lesions.  NO tonsillar enlargement erythema or exudates.   NECK: supple, nontender, no lymphadenopathy  RESP: lungs clear to auscultation - no rales, " rhonchi or wheezes  CV: regular rates and rhythm, normal S1 S2, no murmur noted  NEURO: awake, alert          ASSESSMENT: Well appearing.      ICD-10-CM    1. Upper respiratory tract infection, unspecified type J06.9    2. Cervical adenopathy R59.0          PLAN:  Lots of rest and fluids.  RTC if any worsening symptoms or if not improving.    Yinka Mclaughlin PA-C

## 2020-02-26 ENCOUNTER — OFFICE VISIT (OUTPATIENT)
Dept: CARDIOLOGY | Facility: CLINIC | Age: 30
End: 2020-02-26
Attending: NURSE PRACTITIONER
Payer: COMMERCIAL

## 2020-02-26 VITALS
OXYGEN SATURATION: 99 % | WEIGHT: 156.9 LBS | BODY MASS INDEX: 25.22 KG/M2 | SYSTOLIC BLOOD PRESSURE: 122 MMHG | HEIGHT: 66 IN | DIASTOLIC BLOOD PRESSURE: 78 MMHG | HEART RATE: 68 BPM

## 2020-02-26 DIAGNOSIS — I49.3 PVC'S (PREMATURE VENTRICULAR CONTRACTIONS): Primary | ICD-10-CM

## 2020-02-26 PROCEDURE — G0463 HOSPITAL OUTPT CLINIC VISIT: HCPCS | Mod: ZF

## 2020-02-26 PROCEDURE — 99213 OFFICE O/P EST LOW 20 MIN: CPT | Mod: GC | Performed by: INTERNAL MEDICINE

## 2020-02-26 ASSESSMENT — PAIN SCALES - GENERAL: PAINLEVEL: NO PAIN (0)

## 2020-02-26 ASSESSMENT — MIFFLIN-ST. JEOR: SCORE: 1443.44

## 2020-02-26 NOTE — LETTER
2/26/2020      RE: Ursula De La Cruz  [address] [facility] [address]       Dear Colleague,    Thank you for the opportunity to participate in the care of your patient, Ursula De La Cruz, at the [facility] at [facility]. Please see a copy of my visit note below.    Electrophysiology Clinic Note  HPI:     Ms. De La Cruz is a 29 year old female with PMHx of PVC and hypothyroidism who presents today for follow up after EP study for PVC ablation, however no PVCs were elicited during study.    Patient has a significant history of PVCs. She underwent Holter Monitor in 2014 due to symptoms of SOB and palpitations. Holter Monitor revealed a PVC burden of 9.6% with two patient triggered events related to her PVCs. She underwent CMRI in 2014 that revealed no significant structural heart disease. At the time, she deferred medical management. Recently, patient developed increasing episodes of palpations and nausea. She reached out to Dr. Briceno's clinic due to concern for increasing PVC frequency. A zio patch was ordered which revealed 27% PVC burden and episodes of dizziness and nausea associated with PVCs. She initally presented to clinic on 10/23. She was deemed to be a good candidate for PVC ablation. She underwent EP study with intent for PVC ablation on 11/26, however no PVCs were induced.    Today she returns to clinic for follow up of EP study for PVCs.  She states she has been feeling well and has had minimal symptoms of PVCs. She still feels the PVCs, but they do not bother her and have not been in high frequency like prior. She denies CP, SOB, orthopnea/PND, LH/dizziness, or LE swelling. She did just recently find out she is 8 weeks pregnant with her second child. During her prior pregnancy she had a 2nd trimester echo, which was normal, and wondering if she will need another as she had no issues with her prior pregnancy. She has no further concerns today. EKG was  deferred today. Nubia from November/December with 4.6% PVC burden.      PAST MEDICAL HISTORY:  Past Medical History:   Diagnosis Date     Acne vulgaris      Coronary artery disease     PVCs-asymptomatic. not on any meds for this     Goiter, unspecified 3/17/2015     Hashimoto's thyroiditis 12/21/2016       CURRENT MEDICATIONS:  Current Outpatient Medications   Medication Sig Dispense Refill     magnesium citrate solution Take by mouth once 200mg tab 2 tab. daily       NATURE-THROID 32.5 MG TABS        Prenatal Vit-Fe Fumarate-FA (PRENATAL MULTIVITAMIN PLUS IRON) 27-0.8 MG TABS per tablet Take 1 tablet by mouth daily         PAST SURGICAL HISTORY:  Past Surgical History:   Procedure Laterality Date     BACK SURGERY  2007    L1 compression fracture, no surgery     BREAST SURGERY  2014    elective augmentation     COSMETIC SURGERY      breast augmentation     EP ABLATION PVC N/A 11/26/2019    Procedure: EP ABLATION PVC;  Surgeon: Tony Castro MD;  Location:  HEART CARDIAC CATH LAB       ALLERGIES:   No Known Allergies    FAMILY HISTORY:  Family History   Problem Relation Age of Onset     Alcohol/Drug Father      Depression Father      Alcohol/Drug Maternal Grandmother      Kidney Disease Maternal Grandmother      Depression Maternal Grandmother      Lipids Maternal Grandmother      Endocrine Disease Maternal Grandmother      Heart Disease Maternal Grandmother      Coronary Artery Disease Maternal Grandmother      Hypertension Maternal Grandmother      Cerebrovascular Disease Maternal Grandmother      Thyroid Disease Maternal Grandmother      Known Genetic Syndrome Maternal Grandmother         polycystic kidney disease     Alcohol/Drug Maternal Grandfather      Depression Maternal Grandfather      Diabetes Maternal Grandfather      Hyperlipidemia Maternal Grandfather      Alcohol/Drug Paternal Grandfather      Depression Paternal Grandfather      Depression Paternal Grandmother      Kidney Disease Mother       Endocrine Disease Mother      Thyroid Disease Mother      Thyroid Disease Mother         hypothyroidism     Known Genetic Syndrome Mother         polycystic kidney disease     Cancer No family hx of      Glaucoma No family hx of      Macular Degeneration No family hx of        SOCIAL HISTORY:  Social History     Tobacco Use     Smoking status: Never Smoker     Smokeless tobacco: Never Used   Substance Use Topics     Alcohol use: Yes     Comment: social use only     Drug use: No       ROS:   A comprehensive 10 point review of systems negative other than as mentioned in HPI.  Exam:  There were no vitals taken for this visit.  GENERAL APPEARANCE: alert and no distress  HEENT: no icterus, no xanthelasmas, normal pupil size and reaction, normal palate, mucosa moist, no central cyanosis  NECK: no adenopathy, no asymmetry, masses, or scars, thyroid normal to palpation and no bruits, JVP not elevated  RESPIRATORY: lungs clear to auscultation - no rales, rhonchi or wheezes, no use of accessory muscles, no retractions, respirations are unlabored, normal respiratory rate  CARDIOVASCULAR: regular rhythm, normal S1 with physiologic split S2, no S3 or S4 and no murmur, click or rub, precordium quiet with normal PMI.  ABDOMEN: soft, non tender, bowel sounds normal, non-distended  EXTREMITIES: peripheral pulses normal, no edema  NEURO: alert and oriented to person/place/time, normal speech, gait and affect  SKIN: no ecchymoses, no rashes  PSYCH: normal affect, cooperative    Labs:  Reviewed.     Testing/Procedures:  MosesoPatch 11/26/19    ECHOCARDIOGRAM:   TTE 7/12/2019  Interpretation Summary     No structural source of arrhythmia is identified.  Left and right ventricular function, chamber size, wall motion, and wall  thickness are normal.The LVEF is 55-60%.  No significant valvular abnormalities were noted.  This study was compared with the study from 3/23/18: There has been no change.      cMRI  6/5/2014     IMPRESSION  IMPRESSION:   1.  Normal left ventricular size and systolic function with a  calculated ejection fraction of  61 %.  2.  Normal right ventricular size and systolic function with a  calculated ejection fraction of 58%.   3.  On delayed enhancement imaging, there is no abnormal  hyperenhancement to suggest myocardial scar/inflammation/infiltration     Assessment and Plan:     Ms. De La Cruz is a 29 year old female with PMHx of PVC and hypothyroidism who presents today for referral for PVC ablation.    #Symptomatic PVCs  Patient presenting today with symptomatic and significant PVC burden. TTE revealed no depression of her cardiac function, however she is symptomatic with a 27% PVC burden. She underwent an EP study with possible PVC ablation, however no PVCs were able to be induced during the study.    - Patient is asymptomatic and PVC burden is reasonable in the 4-5% range  - Continue conservative management at this time. Currently on no cardiac medications, no adjustments necessary given she is now pregnant  -- Should she develop increased burden or increased symptoms, we would consider repeat EP study after her pregnancy    - Follow up on prn basis    Javier Perez MD  Cardiology Fellow     EP STAFF NOTE  Patient seen and examined and management plan personally reviewed with the patient. I agree with the note above by the CV/EP fellow.  .  Tony Castro MD [facility]  Cardiology - Electrophysiology

## 2020-02-26 NOTE — PATIENT INSTRUCTIONS
You were seen in the Electrophysiology today by: Dr. Castro    Plan:     Follow up visit: as needed      Your Care Team:  EP Cardiology   Telephone Number     Verena Joy RN [phone]     For scheduling appts or procedures:    Zaynab Short   [phone]   For the Device Clinic (Pacemakers, ICDs, Loop Recorders)    During business hours: [phone]  After business hours:   [phone]- select option 4 and ask for job code [phone].       Cardiovascular Clinic:   [address] [place] [address]. [place] [address]      As always, Thank you for trusting us with your health care needs!

## 2020-02-26 NOTE — NURSING NOTE
Chief Complaint   Patient presents with     Follow Up     3 mo follow-up PVC ablation noninducible. Review zio.     Vitals were taken and medications reconciled.     Matt Baltazar CMA  9:38 AM

## 2020-02-26 NOTE — PROGRESS NOTES
Electrophysiology Clinic Note  HPI:     Ms. De La Cruz is a 29 year old female with PMHx of PVC and hypothyroidism who presents today for follow up after EP study for PVC ablation, however no PVCs were elicited during study.    Patient has a significant history of PVCs. She underwent Holter Monitor in 2014 due to symptoms of SOB and palpitations. Holter Monitor revealed a PVC burden of 9.6% with two patient triggered events related to her PVCs. She underwent CMRI in 2014 that revealed no significant structural heart disease. At the time, she deferred medical management. Recently, patient developed increasing episodes of palpations and nausea. She reached out to Dr. Briceno's clinic due to concern for increasing PVC frequency. A zio patch was ordered which revealed 27% PVC burden and episodes of dizziness and nausea associated with PVCs. She initally presented to clinic on 10/23. She was deemed to be a good candidate for PVC ablation. She underwent EP study with intent for PVC ablation on 11/26, however no PVCs were induced.    Today she returns to clinic for follow up of EP study for PVCs.  She states she has been feeling well and has had minimal symptoms of PVCs. She still feels the PVCs, but they do not bother her and have not been in high frequency like prior. She denies CP, SOB, orthopnea/PND, LH/dizziness, or LE swelling. She did just recently find out she is 8 weeks pregnant with her second child. During her prior pregnancy she had a 2nd trimester echo, which was normal, and wondering if she will need another as she had no issues with her prior pregnancy. She has no further concerns today. EKG was deferred today. Nubia from November/December with 4.6% PVC burden.      PAST MEDICAL HISTORY:  Past Medical History:   Diagnosis Date     Acne vulgaris      Coronary artery disease     PVCs-asymptomatic. not on any meds for this     Goiter, unspecified 3/17/2015     Hashimoto's thyroiditis 12/21/2016       CURRENT  MEDICATIONS:  Current Outpatient Medications   Medication Sig Dispense Refill     magnesium citrate solution Take by mouth once 200mg tab 2 tab. daily       NATURE-THROID 32.5 MG TABS        Prenatal Vit-Fe Fumarate-FA (PRENATAL MULTIVITAMIN PLUS IRON) 27-0.8 MG TABS per tablet Take 1 tablet by mouth daily         PAST SURGICAL HISTORY:  Past Surgical History:   Procedure Laterality Date     BACK SURGERY  2007    L1 compression fracture, no surgery     BREAST SURGERY  2014    elective augmentation     COSMETIC SURGERY      breast augmentation     EP ABLATION PVC N/A 11/26/2019    Procedure: EP ABLATION PVC;  Surgeon: Tony Castro MD;  Location:  HEART CARDIAC CATH LAB       ALLERGIES:   No Known Allergies    FAMILY HISTORY:  Family History   Problem Relation Age of Onset     Alcohol/Drug Father      Depression Father      Alcohol/Drug Maternal Grandmother      Kidney Disease Maternal Grandmother      Depression Maternal Grandmother      Lipids Maternal Grandmother      Endocrine Disease Maternal Grandmother      Heart Disease Maternal Grandmother      Coronary Artery Disease Maternal Grandmother      Hypertension Maternal Grandmother      Cerebrovascular Disease Maternal Grandmother      Thyroid Disease Maternal Grandmother      Known Genetic Syndrome Maternal Grandmother         polycystic kidney disease     Alcohol/Drug Maternal Grandfather      Depression Maternal Grandfather      Diabetes Maternal Grandfather      Hyperlipidemia Maternal Grandfather      Alcohol/Drug Paternal Grandfather      Depression Paternal Grandfather      Depression Paternal Grandmother      Kidney Disease Mother      Endocrine Disease Mother      Thyroid Disease Mother      Thyroid Disease Mother         hypothyroidism     Known Genetic Syndrome Mother         polycystic kidney disease     Cancer No family hx of      Glaucoma No family hx of      Macular Degeneration No family hx of        SOCIAL HISTORY:  Social History     Tobacco  Use     Smoking status: Never Smoker     Smokeless tobacco: Never Used   Substance Use Topics     Alcohol use: Yes     Comment: social use only     Drug use: No       ROS:   A comprehensive 10 point review of systems negative other than as mentioned in HPI.  Exam:  There were no vitals taken for this visit.  GENERAL APPEARANCE: alert and no distress  HEENT: no icterus, no xanthelasmas, normal pupil size and reaction, normal palate, mucosa moist, no central cyanosis  NECK: no adenopathy, no asymmetry, masses, or scars, thyroid normal to palpation and no bruits, JVP not elevated  RESPIRATORY: lungs clear to auscultation - no rales, rhonchi or wheezes, no use of accessory muscles, no retractions, respirations are unlabored, normal respiratory rate  CARDIOVASCULAR: regular rhythm, normal S1 with physiologic split S2, no S3 or S4 and no murmur, click or rub, precordium quiet with normal PMI.  ABDOMEN: soft, non tender, bowel sounds normal, non-distended  EXTREMITIES: peripheral pulses normal, no edema  NEURO: alert and oriented to person/place/time, normal speech, gait and affect  SKIN: no ecchymoses, no rashes  PSYCH: normal affect, cooperative    Labs:  Reviewed.     Testing/Procedures:  ZioPatch 11/26/19    ECHOCARDIOGRAM:   TTE 7/12/2019  Interpretation Summary     No structural source of arrhythmia is identified.  Left and right ventricular function, chamber size, wall motion, and wall  thickness are normal.The LVEF is 55-60%.  No significant valvular abnormalities were noted.  This study was compared with the study from 3/23/18: There has been no change.      cMRI 6/5/2014     IMPRESSION  IMPRESSION:   1.  Normal left ventricular size and systolic function with a  calculated ejection fraction of  61 %.  2.  Normal right ventricular size and systolic function with a  calculated ejection fraction of 58%.   3.  On delayed enhancement imaging, there is no abnormal  hyperenhancement to suggest myocardial  scar/inflammation/infiltration     Assessment and Plan:     Ms. De La Cruz is a 29 year old female with PMHx of PVC and hypothyroidism who presents today for referral for PVC ablation.    #Symptomatic PVCs  Patient presenting today with symptomatic and significant PVC burden. TTE revealed no depression of her cardiac function, however she is symptomatic with a 27% PVC burden. She underwent an EP study with possible PVC ablation, however no PVCs were able to be induced during the study.    - Patient is asymptomatic and PVC burden is reasonable in the 4-5% range  - Continue conservative management at this time. Currently on no cardiac medications, no adjustments necessary given she is now pregnant  -- Should she develop increased burden or increased symptoms, we would consider repeat EP study after her pregnancy    - Follow up on prn basis    Javier Perez MD  Cardiology Fellow     EP STAFF NOTE  Patient seen and examined and management plan personally reviewed with the patient. I agree with the note above by the CV/EP fellow.  .  Tony Castro MD [facility]  Cardiology - Electrophysiology

## 2020-06-25 ENCOUNTER — RESULTS ONLY (OUTPATIENT)
Dept: LAB | Age: 30
End: 2020-06-25

## 2020-06-25 ENCOUNTER — APPOINTMENT (OUTPATIENT)
Dept: LAB | Facility: CLINIC | Age: 30
End: 2020-06-25
Payer: COMMERCIAL

## 2020-06-26 LAB
COVID-19 SPIKE RBD ABY TITER: NORMAL
COVID-19 SPIKE RBD ABY: NEGATIVE

## 2020-10-19 DIAGNOSIS — B37.89 YEAST INFECTION OF NIPPLE, POSTPARTUM: Primary | ICD-10-CM

## 2020-10-19 RX ORDER — NYSTATIN 100000 U/G
CREAM TOPICAL
Qty: 30 G | Refills: 0 | Status: SHIPPED | OUTPATIENT
Start: 2020-10-19 | End: 2022-11-02

## 2020-10-23 ENCOUNTER — TELEPHONE (OUTPATIENT)
Dept: FAMILY MEDICINE | Facility: CLINIC | Age: 30
End: 2020-10-23

## 2020-10-23 ENCOUNTER — OFFICE VISIT (OUTPATIENT)
Dept: URGENT CARE | Facility: URGENT CARE | Age: 30
End: 2020-10-23
Payer: COMMERCIAL

## 2020-10-23 VITALS
TEMPERATURE: 97.6 F | DIASTOLIC BLOOD PRESSURE: 72 MMHG | SYSTOLIC BLOOD PRESSURE: 112 MMHG | OXYGEN SATURATION: 99 % | HEART RATE: 69 BPM

## 2020-10-23 DIAGNOSIS — B37.89 YEAST INFECTION OF NIPPLE, POSTPARTUM: Primary | ICD-10-CM

## 2020-10-23 PROCEDURE — 99213 OFFICE O/P EST LOW 20 MIN: CPT | Performed by: FAMILY MEDICINE

## 2020-10-23 RX ORDER — CLOTRIMAZOLE 1 %
CREAM (GRAM) TOPICAL 2 TIMES DAILY
Qty: 28 G | Refills: 0 | Status: SHIPPED | OUTPATIENT
Start: 2020-10-23 | End: 2022-11-02

## 2020-10-23 RX ORDER — FLUCONAZOLE 200 MG/1
TABLET ORAL
Qty: 15 TABLET | Refills: 0 | Status: SHIPPED | OUTPATIENT
Start: 2020-10-23 | End: 2022-11-02

## 2020-10-23 NOTE — PROGRESS NOTES
Chief complaint: yeast infection    Patient breastfeeding  Baby is 3.5 weeks old   A week ago started having some soreness on both nipples  Very red and tender and shiny.  Baby was seen by doctor and did have thrush and getting treated. Baby seems to be getting better  Mom was empirically treated with nystatin   Not getting better.  Some symptoms are worsening with itching and feeling stabbing pains inside    No Known Allergies    Past Medical History:   Diagnosis Date     Acne vulgaris      Coronary artery disease     PVCs-asymptomatic. not on any meds for this     Goiter, unspecified 3/17/2015     Hashimoto's thyroiditis 12/21/2016            magnesium citrate solution, Take by mouth once 200mg tab 2 tab. daily       NATURE-THROID 32.5 MG TABS,        nystatin (MYCOSTATIN) 114188 UNIT/GM external cream, Apply after each breast feeding, wash carefully prior to each episode.       Prenatal Vit-Fe Fumarate-FA (PRENATAL MULTIVITAMIN PLUS IRON) 27-0.8 MG TABS per tablet, Take 1 tablet by mouth daily    No current facility-administered medications on file prior to visit.       Social History     Tobacco Use     Smoking status: Never Smoker     Smokeless tobacco: Never Used   Substance Use Topics     Alcohol use: Yes     Comment: social use only     Drug use: No       ROS:  review of systems negative except for noted above.       OBJECTIVE:  /72   Pulse 69   Temp 97.6  F (36.4  C) (Tympanic)   SpO2 99%    General:   awake, alert, and cooperative.  NAD.   Head: Normocephalic, atraumatic.  Eyes: Conjunctiva clear  Neuro: Alert and oriented - normal speech.  MS: Using extremities freely  PSYCH:  Normal affect, normal speech  SKIN:   Bilateral nipple skin shiny periareolar    ASSESSMENT:    ICD-10-CM    1. Yeast infection of nipple, postpartum  O91.02 clotrimazole (LOTRIMIN) 1 % external cream    B37.89 fluconazole (DIFLUCAN) 200 MG tablet       PLAN:   No relief - worsening per patient   Trial of lotrimin. Stop  nystatin.  If symptoms persist then start diflucan  Side effects discussed  Follow up if symptoms do not improve sooner if worsening       Advised about symptoms which might herald more serious problems.        Vero Pires MD

## 2020-10-23 NOTE — TELEPHONE ENCOUNTER
Provider: Please see [employer] message that mom sent in her child's chart below.  Please advise, does the treatment plan need to be changed? Please send new Prescription(s) if appropriate. Thank you. Teresa Munguia R.N.

## 2020-11-02 ENCOUNTER — MEDICAL CORRESPONDENCE (OUTPATIENT)
Dept: HEALTH INFORMATION MANAGEMENT | Facility: CLINIC | Age: 30
End: 2020-11-02

## 2020-11-30 ENCOUNTER — MEDICAL CORRESPONDENCE (OUTPATIENT)
Dept: HEALTH INFORMATION MANAGEMENT | Facility: CLINIC | Age: 30
End: 2020-11-30

## 2020-12-06 ENCOUNTER — HEALTH MAINTENANCE LETTER (OUTPATIENT)
Age: 30
End: 2020-12-06

## 2021-04-07 ENCOUNTER — MEDICAL CORRESPONDENCE (OUTPATIENT)
Dept: HEALTH INFORMATION MANAGEMENT | Facility: CLINIC | Age: 31
End: 2021-04-07

## 2021-04-30 ENCOUNTER — TELEPHONE (OUTPATIENT)
Dept: CARDIOLOGY | Facility: CLINIC | Age: 31
End: 2021-04-30

## 2021-04-30 DIAGNOSIS — I49.3 PVC'S (PREMATURE VENTRICULAR CONTRACTIONS): Primary | ICD-10-CM

## 2021-04-30 NOTE — TELEPHONE ENCOUNTER
Patient request to know if a echocardiogram needed for 1 year check. If no test needed patient request to decline visit.Johnnie Holt L.P.N.,Maninder mosqueda. Dept.

## 2021-04-30 NOTE — TELEPHONE ENCOUNTER
End of July Rinku Briceno MD.,Cardiology  Visit due .Left message to return clinic call to .  Johnnie Holt L.P.N.

## 2021-05-03 NOTE — TELEPHONE ENCOUNTER
I tried to contact patient to see how she is feeling, no answer, left message to call back or mychart with an update.     Can, do you want this patient to have an echocardiogram prior to her 2 year visit?  Hx of high PVC burdency.      Juliet Navarrete, RN  Cardiology Care Coordinator  [facility]  [phone] option 1

## 2021-05-04 NOTE — TELEPHONE ENCOUNTER
Echo ordered.  Ma/LPN please contact patient and schedule echo and follow up.    Juliet Navarrete RN

## 2021-05-17 ENCOUNTER — MYC MEDICAL ADVICE (OUTPATIENT)
Dept: CARDIOLOGY | Facility: CLINIC | Age: 31
End: 2021-05-17

## 2021-09-25 ENCOUNTER — HEALTH MAINTENANCE LETTER (OUTPATIENT)
Age: 31
End: 2021-09-25

## 2022-01-15 ENCOUNTER — HEALTH MAINTENANCE LETTER (OUTPATIENT)
Age: 32
End: 2022-01-15

## 2022-05-19 ENCOUNTER — TELEPHONE (OUTPATIENT)
Dept: NURSING | Facility: CLINIC | Age: 32
End: 2022-05-19

## 2022-05-19 ENCOUNTER — OFFICE VISIT (OUTPATIENT)
Dept: URGENT CARE | Facility: URGENT CARE | Age: 32
End: 2022-05-19
Payer: COMMERCIAL

## 2022-05-19 VITALS
WEIGHT: 162 LBS | DIASTOLIC BLOOD PRESSURE: 78 MMHG | RESPIRATION RATE: 24 BRPM | OXYGEN SATURATION: 98 % | HEART RATE: 96 BPM | BODY MASS INDEX: 26.15 KG/M2 | TEMPERATURE: 99.6 F | SYSTOLIC BLOOD PRESSURE: 123 MMHG

## 2022-05-19 DIAGNOSIS — Z33.1 PREGNANT STATE, INCIDENTAL: ICD-10-CM

## 2022-05-19 DIAGNOSIS — U07.1 INFECTION DUE TO 2019 NOVEL CORONAVIRUS: ICD-10-CM

## 2022-05-19 DIAGNOSIS — R50.9 FEVER IN ADULT: Primary | ICD-10-CM

## 2022-05-19 LAB
ALBUMIN UR-MCNC: NEGATIVE MG/DL
APPEARANCE UR: CLEAR
BILIRUB UR QL STRIP: NEGATIVE
COLOR UR AUTO: YELLOW
DEPRECATED S PYO AG THROAT QL EIA: NEGATIVE
FLUAV AG SPEC QL IA: NEGATIVE
FLUBV AG SPEC QL IA: NEGATIVE
GLUCOSE UR STRIP-MCNC: NEGATIVE MG/DL
HGB UR QL STRIP: NEGATIVE
KETONES UR STRIP-MCNC: NEGATIVE MG/DL
LEUKOCYTE ESTERASE UR QL STRIP: NEGATIVE
NITRATE UR QL: NEGATIVE
PH UR STRIP: 6 [PH] (ref 5–7)
SARS-COV-2 RNA RESP QL NAA+PROBE: POSITIVE
SP GR UR STRIP: 1.02 (ref 1–1.03)
UROBILINOGEN UR STRIP-ACNC: 0.2 E.U./DL

## 2022-05-19 PROCEDURE — 99214 OFFICE O/P EST MOD 30 MIN: CPT | Performed by: FAMILY MEDICINE

## 2022-05-19 PROCEDURE — 87804 INFLUENZA ASSAY W/OPTIC: CPT | Performed by: FAMILY MEDICINE

## 2022-05-19 PROCEDURE — 81003 URINALYSIS AUTO W/O SCOPE: CPT | Performed by: FAMILY MEDICINE

## 2022-05-19 PROCEDURE — U0003 INFECTIOUS AGENT DETECTION BY NUCLEIC ACID (DNA OR RNA); SEVERE ACUTE RESPIRATORY SYNDROME CORONAVIRUS 2 (SARS-COV-2) (CORONAVIRUS DISEASE [COVID-19]), AMPLIFIED PROBE TECHNIQUE, MAKING USE OF HIGH THROUGHPUT TECHNOLOGIES AS DESCRIBED BY CMS-2020-01-R: HCPCS | Performed by: FAMILY MEDICINE

## 2022-05-19 PROCEDURE — 87651 STREP A DNA AMP PROBE: CPT | Performed by: FAMILY MEDICINE

## 2022-05-19 PROCEDURE — U0005 INFEC AGEN DETEC AMPLI PROBE: HCPCS | Performed by: FAMILY MEDICINE

## 2022-05-19 NOTE — PROGRESS NOTES
Chief complaint: fever    Started feeling tired and body aches yesterday  Today started feeling worse  Fever and chills not measured    11 weeks pregnant   Patient took tylenol last 4pm     Mild sore throat  No cough  Mild runny nose    Having back pain both sides body aches    No Known Allergies    Past Medical History:   Diagnosis Date     Acne vulgaris      Coronary artery disease     PVCs-asymptomatic. not on any meds for this     Goiter, unspecified 3/17/2015     Hashimoto's thyroiditis 12/21/2016       magnesium citrate solution, Take by mouth once 200mg tab 2 tab. daily  NATURE-THROID 32.5 MG TABS,   Prenatal Vit-Fe Fumarate-FA (PRENATAL MULTIVITAMIN PLUS IRON) 27-0.8 MG TABS per tablet, Take 1 tablet by mouth daily  clotrimazole (LOTRIMIN) 1 % external cream, Apply topically 2 times daily For 2 weeks (Patient not taking: Reported on 5/19/2022)  fluconazole (DIFLUCAN) 200 MG tablet, Take 2 tablets by mouth for the first day, then take one tablet by mouth daily for 13 days (Patient not taking: Reported on 5/19/2022)  nystatin (MYCOSTATIN) 530129 UNIT/GM external cream, Apply after each breast feeding, wash carefully prior to each episode. (Patient not taking: Reported on 5/19/2022)    No current facility-administered medications on file prior to visit.      Social History     Tobacco Use     Smoking status: Never Smoker     Smokeless tobacco: Never Used   Substance Use Topics     Alcohol use: Yes     Comment: social use only     Drug use: No       ROS:  review of systems negative except for noted above.       OBJECTIVE:  /78   Pulse 96   Temp 99.6  F (37.6  C) (Tympanic)   Resp 24   Wt 73.5 kg (162 lb)   SpO2 98%   BMI 26.15 kg/m     General:   awake, alert, and cooperative.  NAD.   Head: Normocephalic, atraumatic.  Eyes: Conjunctiva clear,   Heart: Regular rate and rhythm. No murmur.  Lungs: Chest is clear; no wheezes or rales.   Abdomen: soft non-tender.  Neuro: Alert and oriented - normal  speech.  MS: Using extremities freely  PSYCH:  Normal affect, normal speech  SKIN: no obvious rashes  Diagnostic Test Results:  Results for orders placed or performed in visit on 05/19/22 (from the past 24 hour(s))   Influenza A & B Antigen - Clinic Collect    Specimen: Nose; Swab   Result Value Ref Range    Influenza A antigen Negative Negative    Influenza B antigen Negative Negative    Narrative    Test results must be correlated with clinical data. If necessary, results should be confirmed by a molecular assay or viral culture.   Symptomatic; Yes; 5/18/2022 COVID-19 Virus (Coronavirus) by PCR Nose    Specimen: Nose; Swab   Result Value Ref Range    SARS CoV2 PCR Positive (A) Negative, Testing sent to reference lab. Results will be returned via unsolicited result    Narrative    Testing was performed using the [employer] Xpress SARS-CoV-2 Assay on the  [employer]. Additional information about  this Emergency Use Authorization (EUA) assay can be found via the Lab  Guide. This test should be ordered for the detection of SARS-CoV-2 in  individuals who meet SARS-CoV-2 clinical and/or epidemiological  criteria. Test performance is unknown in asymptomatic patients. This  test is for in vitro diagnostic use under the FDA EUA for  laboratories certified under CLIA to perform high complexity testing.  This test has not been FDA cleared or approved. A negative result  does not rule out the presence of PCR inhibitors in the specimen or  target RNA in concentration below the limit of detection for the  assay. The possibility of a false negative should be considered if  the patient's recent exposure or clinical presentation suggests  COVID-19. This test was validated by the [facility]. This laboratory is certified under  the Clinical Laboratory Improvement Amendments of 1988 (CLIA-88) as  qualified to perform high complexity laboratory testing.      Streptococcus A Rapid Screen w/Reflex to PCR - Clinic Collect    Specimen: Throat; Swab   Result Value Ref Range    Group A Strep antigen Negative Negative   UA Macro with Reflex to Micro and Culture - lab collect    Specimen: Urine, Midstream   Result Value Ref Range    Color Urine Yellow Colorless, Straw, Light Yellow, Yellow    Appearance Urine Clear Clear    Glucose Urine Negative Negative mg/dL    Bilirubin Urine Negative Negative    Ketones Urine Negative Negative mg/dL    Specific Gravity Urine 1.025 1.003 - 1.035    Blood Urine Negative Negative    pH Urine 6.0 5.0 - 7.0    Protein Albumin Urine Negative Negative mg/dL    Urobilinogen Urine 0.2 0.2, 1.0 E.U./dL    Nitrite Urine Negative Negative    Leukocyte Esterase Urine Negative Negative    Narrative    Microscopic not indicated         ASSESSMENT:    ICD-10-CM    1. Fever in adult  R50.9 Influenza A & B Antigen - Clinic Collect     Symptomatic; Yes; 5/18/2022 COVID-19 Virus (Coronavirus) by PCR Nose     Streptococcus A Rapid Screen w/Reflex to PCR - Clinic Collect     UA Macro with Reflex to Micro and Culture - lab collect     Group A Streptococcus PCR Throat Swab     UA Macro with Reflex to Micro and Culture - lab collect     CANCELED: UA Macro with Reflex to Micro and Culture - lab collect   2. Pregnant state, incidental  Z33.1    3. Infection due to 2019 novel coronavirus  U07.1        PLAN:   Urinalysis negative flu strep negative  Coivd later on came back positive   Patient was already notified by staff and has a virtual visit set up for covid treatment  Recommend that patient notify her OBGYN as well    Alarm signs or symptoms discussed, if present recommend go to ER   If with any symptoms of chest pain or shortness of breath, lightheadedness, palpitations, feeling like passing out or change and worsening in the quality of your symptoms, please proceed to ER. Recommend follow up with PCP in a few days for re-evaluation.         Advised about  symptoms which might herald more serious problems.        Vero Pires MD

## 2022-05-20 ENCOUNTER — TRANSFERRED RECORDS (OUTPATIENT)
Dept: HEALTH INFORMATION MANAGEMENT | Facility: CLINIC | Age: 32
End: 2022-05-20

## 2022-05-20 LAB — GROUP A STREP BY PCR: NOT DETECTED

## 2022-05-20 NOTE — TELEPHONE ENCOUNTER
"Coronavirus (COVID-19) Notification    Caller Name (Patient, parent, daughter/son, grandparent, etc)  Ursual     Reason for call  Notify of Positive Coronavirus (COVID-19) lab results, assess symptoms,  review  [employer] recommendations    Lab Result    Lab test:  2019-nCoV rRt-PCR or SARS-CoV-2 PCR    Oropharyngeal AND/OR nasopharyngeal swabs is POSITIVE for 2019-nCoV RNA/SARS-COV-2 PCR (COVID-19 virus)    Brief introduction script  Introduce self then review script:  \"I am calling on behalf of [employer].  We were notified that your Coronavirus test (COVID-19) for was POSITIVE for the virus.\"    Gather patient reported symptoms   Assessment   Current Symptoms at time of phone call, reported by patient: (if no symptoms, document No symptoms] Fever, headache, bodyache   Date of Symptom(s) onset (if applicable) 5/18/22     If at time of call, Patients symptoms hare worsened, the Patient should contact 911 or have someone drive them to Emergency Dept promptly:      If Patient calling 911, inform 911 personal that you have tested positive for the Coronavirus (COVID-19).  Place mask on and await 911 to arrive.    If Emergency Dept, If possible, please have another adult drive you to the Emergency Dept but you need to wear mask when in contact with other people.      Monoclonal Antibody Administration    You may be eligible to receive a new treatment with a monoclonal antibody for preventing hospitalization in patients at high risk for complications from COVID-19. This medication is still experimental and available on a limited basis; it is given through an IV and must be given at an infusion center. Please note that not all people who are eligible will receive the medication since it is in limited supply.  Is the patient symptomatic and onset of symptoms within the last 7 days? No. Patient does not qualify.    Review information with Patient    Your result was positive. This means you have COVID-19 " (coronavirus).      How can I protect others?    These guidelines are for isolating before returning to work, school or .       If you DO have symptoms:  o Stay home and away from others  - For at least 5 days after your symptoms started, AND   - You are fever free for 24 hours (with no medicine that reduces fever), AND  - Your other symptoms are better.  o Wear a mask for 10 full days any time you are around others.    If you DON'T have symptoms:  o Stay at home and away from others for at least 5 days after your positive test.  o Wear a mask for 10 full days any time you are around others.    There may be different guidelines for healthcare facilities. Please check with the specific sites before arriving.     If you've been told by a doctor that you were severely ill with COVID-19 or are immunocompromised, you should isolate for at least 10 days.    You should not go back to work until you meet the guidelines above for ending your home isolation. You don't need to be retested for COVID-19 before going back to work--studies show that you won't spread the virus if it's been at least 10 days since your symptoms started (or 20 days, if you have a weak immune system).    Employers, schools, and daycares: This is an official notice for this person's medical guidelines for returning in-person. They must meet the above guidelines before going back to work, school, or  in person.    You will receive a positive COVID-19 letter via [employer] or the mail soon with additional self-care information (exception, no letters sent to presurgical/preprocedure patients).     Would you like me to review some of that information with you now?  Yes    How can I take care of myself?      Get lots of rest. Drink extra fluids (unless a doctor has told you not to).      Take Tylenol (acetaminophen) for fever or pain. If you have liver or kidney problems, ask your family doctor if it's okay to take Tylenol.     Take either:      650 mg (two 325 mg pills) every 4 to 6 hours, or     1,000 mg (two 500 mg pills) every 8 hours as needed.     Note: Do not take more than 3,000 mg in one day. Acetaminophen is found in many medicines (both prescribed and over-the-counter medicines). Read all labels to be sure you don't take too much.    For children, check the Tylenol bottle for the right dose (based on their age or weight).      If you have other health problems (like cancer, heart failure, an organ transplant or severe kidney disease): Call your specialty clinic if you don't feel better in the next 2 days.      Know when to call 911: Emergency warning signs include:    Trouble breathing or shortness of breath    Pain or pressure in the chest that doesn't go away    Feeling confused like you haven't felt before, or not being able to wake up    Bluish-colored lips or face        If you were tested for an upcoming procedure, please contact your provider for next steps.       Transferred Pt to [facility] to schedule antiviral treatment.  Shelby Farias RN

## 2022-05-20 NOTE — TELEPHONE ENCOUNTER
Patient classified as COVID treatment eligible by Epic high risk algorithm:  Yes    Coronavirus (COVID-19) Notification    Reason for call  Notify of POSITIVE COVID-19 lab result, assess symptoms,  review [facility] recommendations    Lab Result   Lab test for 2019-nCoV rRt-PCR or SARS-COV-2 PCR  Oropharyngeal AND/OR nasopharyngeal swabs were POSITIVE for 2019-nCoV RNA [OR] SARS-COV-2 RNA (COVID-19) RNA     We have been unable to reach patient by phone at this time to notify of their Positive COVID-19 result.    Left voicemail message requesting a call back to [phone] [facility] for results.        A Positive COVID-19 letter will be sent via [employer] or the mail. (Exception, no letters sent to Presurgerical/Preprocedure Patients)    Jenna Mccann LPN

## 2022-10-28 ENCOUNTER — ANCILLARY PROCEDURE (OUTPATIENT)
Dept: CARDIOLOGY | Facility: CLINIC | Age: 32
End: 2022-10-28
Attending: INTERNAL MEDICINE
Payer: COMMERCIAL

## 2022-10-28 DIAGNOSIS — I49.3 PVC'S (PREMATURE VENTRICULAR CONTRACTIONS): ICD-10-CM

## 2022-10-28 LAB — LVEF ECHO: NORMAL

## 2022-10-28 PROCEDURE — 93306 TTE W/DOPPLER COMPLETE: CPT | Performed by: INTERNAL MEDICINE

## 2022-11-01 NOTE — PROGRESS NOTES
Referring provider:Candido Narvaez MD     Chief complaint: palpitations    HPI: Ms. Ursula De La Cruz is a 29 year old  female with PMH significant for frequent PVCs and hypothyroidism.    The patient returns for follow-up to recheck on frequent PVCs.  She has been followed in cardiology since 2014.  Last cardiology visit was 1 year ago when she was pregnant and she reported overall unchanged frequency of PVCs during pregnancy.    She recently had a 48-hour Holter monitoring (I have personally reviewed) on 7/9/2019 which showed frequent bigeminal PVCs total burden 27%.  The patient reports daily PVCs sometimes lasting up to an hour. She feels PVCs mostly when she is not busy. She works as a nurse at [employer] and sometimes she hooks herself up to a monitor and sees bigeminal PVCs.  She reports feeling nauseous and not feeling well when she has bigeminal PVCs.  Otherwise she denies associated shortness of breath, dizziness, chest pain or syncope.  Patient has noticed that coffee triggers more PVCs.  Physical activity does not make it worse.    The patient is physically active in her daily life and has no other cardiac symptoms.  No prior history of hypertension, diabetes, hyperlipidemia, tobacco abuse or family history of sudden cardiac death/coronary artery disease.  Her mother has history of polycystic kidney disease. She did not undergo screening for PCK.    She had a cardiac MRI in 2014 which showed a structurally normal heart.  Since 2014 she had several echocardiograms most recent one on 7/12/2019 which I have personally reviewed.  She has normal LV/RV size and function.     She is currently only on thyroid hormone replacement.     I reviewed patient's EKG in clinic today which shows bigeminal PVCs with inferior axis and left bundle branch block morphology.    Medications, personal, family, and social history reviewed with patient and revised.     Interval history 11/2/2022:   She was last  seen in clinic on 7/30/2019 for symptomatic PVCs . She had an EP study with Dr. Castro on 11/26/2019, which did not elicit inducible PVCs so ablation was not done.  Patient did not have any PVCs since then.  She is an ICU nurse.   Patient is currently 35 week pregnant and she has been having SOB and paplitations so referred to clinic from OB/GYN.  She feels short of breath with racing heart rate which is not irregular per patient report.  Usually occurs when she is upright.  Over the last 3 to 4 weeks.  Does not last very long.  Maybe few minutes.  No associated dizziness or syncope.  Patient reports no chest pain, PND or lower extremity edema.    Patient brought an EKG rhythm strip when she was having palpitations which shows sinus tachycardia heart rate 120 bpm.    Most recent echocardiogram on 10/28/2022 showed normal biventricular size and function.    PAST MEDICAL HISTORY:  Past Medical History:   Diagnosis Date     Acne vulgaris      Coronary artery disease     PVCs-asymptomatic. not on any meds for this     Goiter, unspecified 3/17/2015     Hashimoto's thyroiditis 12/21/2016       CURRENT MEDICATIONS:  Current Outpatient Medications   Medication Sig Dispense Refill     clotrimazole (LOTRIMIN) 1 % external cream Apply topically 2 times daily For 2 weeks (Patient not taking: Reported on 5/19/2022) 28 g 0     fluconazole (DIFLUCAN) 200 MG tablet Take 2 tablets by mouth for the first day, then take one tablet by mouth daily for 13 days (Patient not taking: Reported on 5/19/2022) 15 tablet 0     magnesium citrate solution Take by mouth once 200mg tab 2 tab. daily       NATURE-THROID 32.5 MG TABS        nystatin (MYCOSTATIN) 028371 UNIT/GM external cream Apply after each breast feeding, wash carefully prior to each episode. (Patient not taking: Reported on 5/19/2022) 30 g 0     Prenatal Vit-Fe Fumarate-FA (PRENATAL MULTIVITAMIN PLUS IRON) 27-0.8 MG TABS per tablet Take 1 tablet by mouth daily         PAST SURGICAL  HISTORY:  Past Surgical History:   Procedure Laterality Date     BACK SURGERY  2007    L1 compression fracture, no surgery     BREAST SURGERY  2014    elective augmentation     COSMETIC SURGERY      breast augmentation     EP ABLATION PVC N/A 11/26/2019    Procedure: EP ABLATION PVC;  Surgeon: Tony Castro MD;  Location:  [facility]       ALLERGIES:   No Known Allergies    FAMILY HISTORY:  Family History   Problem Relation Age of Onset     Alcohol/Drug Father      Depression Father      Alcohol/Drug Maternal Grandmother      Kidney Disease Maternal Grandmother      Depression Maternal Grandmother      Lipids Maternal Grandmother      Endocrine Disease Maternal Grandmother      Heart Disease Maternal Grandmother      Coronary Artery Disease Maternal Grandmother      Hypertension Maternal Grandmother      Cerebrovascular Disease Maternal Grandmother      Thyroid Disease Maternal Grandmother      Known Genetic Syndrome Maternal Grandmother         polycystic kidney disease     Alcohol/Drug Maternal Grandfather      Depression Maternal Grandfather      Diabetes Maternal Grandfather      Hyperlipidemia Maternal Grandfather      Alcohol/Drug Paternal Grandfather      Depression Paternal Grandfather      Depression Paternal Grandmother      Kidney Disease Mother      Endocrine Disease Mother      Thyroid Disease Mother      Thyroid Disease Mother         hypothyroidism     Known Genetic Syndrome Mother         polycystic kidney disease     Cancer No family hx of      Glaucoma No family hx of      Macular Degeneration No family hx of          SOCIAL HISTORY:  Social History     Tobacco Use     Smoking status: Never     Smokeless tobacco: Never   Substance Use Topics     Alcohol use: Yes     Comment: social use only     Drug use: No       ROS:   Constitutional: No fever, chills, or sweats. Weight stable.   Cardiovascular: As per HPI.     Exam:  /62 (BP Location: Right arm, Patient Position: Sitting,  Cuff Size: Adult Regular)   Pulse 99   SpO2 100%   GENERAL APPEARANCE: alert and no distress  HEENT: no icterus, no central cyanosis  LYMPH/NECK: no adenopathy, no asymmetry, JVP not elevated  CARDIOVASCULAR: regular rhythm, normal S1, S2, no S3 or S4 and no murmur, click or rub, precordium quiet with normal PMI.  GI: Pregnant  EXTREMITIES:  no edema  NEURO: alert, normal speech,and affect.   SKIN: no ecchymoses, no rashes     I have reviewed the labs and personally reviewed the imaging below (EKG, zio patch and echo) and made my comment in the assessment and plan.    Labs:  CBC RESULTS:   Lab Results   Component Value Date    WBC 6.0 11/26/2019    RBC 4.46 11/26/2019    HGB 13.8 11/26/2019    HCT 39.9 11/26/2019    MCV 90 11/26/2019    MCH 30.9 11/26/2019    MCHC 34.6 11/26/2019    RDW 11.7 11/26/2019     11/26/2019       BMP RESULTS:  Lab Results   Component Value Date     11/26/2019    POTASSIUM 4.2 11/26/2019    CHLORIDE 108 11/26/2019    CO2 24 11/26/2019    ANIONGAP 6 11/26/2019    GLC 91 11/26/2019    BUN 9 11/26/2019    CR 0.69 11/26/2019    GFRESTIMATED >90 11/26/2019    GFRESTBLACK >90 11/26/2019    HELADIO 9.0 11/26/2019      Echocardiogram 10/28/2022  Global and regional left ventricular function is normal with an EF of 60-65%.  Global right ventricular function is normal.     This study was compared with the study from 2019. No significant changes  noted.    Holter monitor for 48 hours 7/9/2019: Frequent PVCs 27%    Echocardiogram 7/12/2019  No structural source of arrhythmia is identified.  Left and right ventricular function, chamber size, wall motion, and wall  thickness are normal.The LVEF is 55-60%.  No significant valvular abnormalities were noted.  This study was compared with the study from 3/23/18: There has been no change.    Cardiac MRI 6/2/2014  1.  Normal left ventricular size and systolic function with a  calculated ejection fraction of  61 %.  2.  Normal right ventricular size  and systolic function with a  calculated ejection fraction of 58%.   3.  On delayed enhancement imaging, there is no abnormal  hyperenhancement to suggest myocardial scar/inflammation/infiltration    Echo 10/28/2022  Global and regional left ventricular function is normal with an EF of 60-65%.  Global right ventricular function is normal.     This study was compared with the study from 2019. No significant changes  Noted.    14 day ziopatch 2019        Assessment and Plan:     Ms. Ursula De La Cruz is a 32 year old  female with PMH significant for frequent PVCs (34% burden on Holter monitor in 2019) and hypothyroidism.  Patient underwent EP study in 2020 which did not elicit any PVCs.      She is presenting to clinic for palpitations.  She is 34 weeks pregnant.  Patient is an ICU nurse.  She obtained EKG rhythm strip during the symptom which shows sinus tachycardia (patient brought the rhythm strip to clinic today)    1. SOB, palpitations:  -Mostly when she is standing over the last few weeks.  EKG rhythm strip shows sinus tachycardia which is likely secondary to pregnancy.  Do not suspect any pathologic arrhythmia.  Recent echocardiogram is unremarkable.  Physical exam and vital signs are unremarkable today.  Resting heart rate is normal.    -Recommend attention to hydration.  -No further work-up required at this time.    2. History of symptomatic PVCs  -Appears to have resolved since 2020.    Return to clinic as needed.    Discussed with Dr. Janak Tatum MD  Cardiovascular disease fellow  [facility]  [phone]  11/01/2022 8:31 AM     Attending note  11/2/2022      I have personally seen and examined the patient in clinic today. I agree with the fellow's findings and plan of care as documented in the note.       I personally reviewed vital signs, medications, labs, imaging, and ECG.     The history and physical findings are accurate as recorded. My additional findings, if  any, have been incorporated into the body of the note. The assessment and plans outlined reflect our joint decision making.     Total time spent 25 minutes including prior charting, face-to-face clinic visit, review of outside hospital medical records and medical documentation.     Rinku FOWLER MD  [facility] Division of Cardiology  Pager [phone]

## 2022-11-02 ENCOUNTER — OFFICE VISIT (OUTPATIENT)
Dept: CARDIOLOGY | Facility: CLINIC | Age: 32
End: 2022-11-02
Payer: COMMERCIAL

## 2022-11-02 VITALS — DIASTOLIC BLOOD PRESSURE: 62 MMHG | SYSTOLIC BLOOD PRESSURE: 100 MMHG | HEART RATE: 99 BPM | OXYGEN SATURATION: 100 %

## 2022-11-02 DIAGNOSIS — Z3A.35 35 WEEKS GESTATION OF PREGNANCY: ICD-10-CM

## 2022-11-02 DIAGNOSIS — R00.0 SINUS TACHYCARDIA: Primary | ICD-10-CM

## 2022-11-02 DIAGNOSIS — R00.2 PALPITATIONS: ICD-10-CM

## 2022-11-02 PROCEDURE — 99203 OFFICE O/P NEW LOW 30 MIN: CPT | Mod: GC | Performed by: INTERNAL MEDICINE

## 2022-11-02 NOTE — NURSING NOTE
"Chief Complaint   Patient presents with     Follow Up     Tachycardia, 33 wks pregnant       Initial BP (!) 143/84 (BP Location: Left arm, Patient Position: Sitting, Cuff Size: Adult Regular)   Pulse 75   SpO2 100%  Estimated body mass index is 26.15 kg/m  as calculated from the following:    Height as of 2/26/20: 1.676 m (5' 6\").    Weight as of 5/19/22: 73.5 kg (162 lb)..  BP completed using cuff size: regular    BRAD Cota    "

## 2022-11-02 NOTE — PATIENT INSTRUCTIONS
Thank you for coming to the [facility] at [place]; please note the following instructions:    1. Dr. Briceno would like you to pay attention to your hydration and make sure you are hydrating well    2. Follow up as needed in cardiology        If you have any questions regarding your visit, please contact your care team:     CARDIOLOGY  TELEPHONE NUMBER   Juliet GONZALEZ, Registered Nurse  Maddison AVILES, Registered Nurse  Sherrill HENRIQUEZ, Registered Medical Assistant  Meche MARROQUIN, Visit Facilitator [phone] (select option 1)    *After hours: [phone]   For Scheduling Appts:     [phone] (select option 1)    *After hours: [phone]   For the Device Clinic (Pacemakers and ICD's)  Ashely WHITE, Registered Nurse   During business hours: [phone]    *After business hours:  [phone] (select option 4)      Normal test result notifications will be released via [employer] or mailed within 7 business days.  All other test results, will be communicated via telephone once reviewed by your cardiologist.    If you need a medication refill, please contact your pharmacy.  Please allow 3 business days for your refill to be completed.    As always, thank you for trusting us with your health care needs!

## 2022-11-02 NOTE — LETTER
11/2/2022      RE: Ursula De La Cruz  [address] [facility] [address]       Dear Colleague,    Thank you for the opportunity to participate in the care of your patient, Ursula De La Cruz, at the [facility]. Please see a copy of my visit note below.    Referring provider:Candido Narvaez MD     Chief complaint: palpitations    HPI: Ms. Ursula De La Cruz is a 29 year old  female with PMH significant for frequent PVCs and hypothyroidism.    The patient returns for follow-up to recheck on frequent PVCs.  She has been followed in cardiology since 2014.  Last cardiology visit was 1 year ago when she was pregnant and she reported overall unchanged frequency of PVCs during pregnancy.    She recently had a 48-hour Holter monitoring (I have personally reviewed) on 7/9/2019 which showed frequent bigeminal PVCs total burden 27%.  The patient reports daily PVCs sometimes lasting up to an hour. She feels PVCs mostly when she is not busy. She works as a nurse at [employer] and sometimes she hooks herself up to a monitor and sees bigeminal PVCs.  She reports feeling nauseous and not feeling well when she has bigeminal PVCs.  Otherwise she denies associated shortness of breath, dizziness, chest pain or syncope.  Patient has noticed that coffee triggers more PVCs.  Physical activity does not make it worse.    The patient is physically active in her daily life and has no other cardiac symptoms.  No prior history of hypertension, diabetes, hyperlipidemia, tobacco abuse or family history of sudden cardiac death/coronary artery disease.  Her mother has history of polycystic kidney disease. She did not undergo screening for PCK.    She had a cardiac MRI in 2014 which showed a structurally normal heart.  Since 2014 she had several echocardiograms most recent one on 7/12/2019 which I have personally reviewed.  She has normal LV/RV size  and function.     She is currently only on thyroid hormone replacement.     I reviewed patient's EKG in clinic today which shows bigeminal PVCs with inferior axis and left bundle branch block morphology.    Medications, personal, family, and social history reviewed with patient and revised.     Interval history 11/2/2022:   She was last seen in clinic on 7/30/2019 for symptomatic PVCs . She had an EP study with Dr. Castro on 11/26/2019, which did not elicit inducible PVCs so ablation was not done.  Patient did not have any PVCs since then.  She is an ICU nurse.   Patient is currently 35 week pregnant and she has been having SOB and paplitations so referred to clinic from OB/GYN.  She feels short of breath with racing heart rate which is not irregular per patient report.  Usually occurs when she is upright.  Over the last 3 to 4 weeks.  Does not last very long.  Maybe few minutes.  No associated dizziness or syncope.  Patient reports no chest pain, PND or lower extremity edema.    Patient brought an EKG rhythm strip when she was having palpitations which shows sinus tachycardia heart rate 120 bpm.    Most recent echocardiogram on 10/28/2022 showed normal biventricular size and function.    PAST MEDICAL HISTORY:  Past Medical History:   Diagnosis Date     Acne vulgaris      Coronary artery disease     PVCs-asymptomatic. not on any meds for this     Goiter, unspecified 3/17/2015     Hashimoto's thyroiditis 12/21/2016       CURRENT MEDICATIONS:  Current Outpatient Medications   Medication Sig Dispense Refill     clotrimazole (LOTRIMIN) 1 % external cream Apply topically 2 times daily For 2 weeks (Patient not taking: Reported on 5/19/2022) 28 g 0     fluconazole (DIFLUCAN) 200 MG tablet Take 2 tablets by mouth for the first day, then take one tablet by mouth daily for 13 days (Patient not taking: Reported on 5/19/2022) 15 tablet 0     magnesium citrate solution Take by mouth once 200mg tab 2 tab. daily        NATURE-THROID 32.5 MG TABS        nystatin (MYCOSTATIN) 354099 UNIT/GM external cream Apply after each breast feeding, wash carefully prior to each episode. (Patient not taking: Reported on 5/19/2022) 30 g 0     Prenatal Vit-Fe Fumarate-FA (PRENATAL MULTIVITAMIN PLUS IRON) 27-0.8 MG TABS per tablet Take 1 tablet by mouth daily         PAST SURGICAL HISTORY:  Past Surgical History:   Procedure Laterality Date     BACK SURGERY  2007    L1 compression fracture, no surgery     BREAST SURGERY  2014    elective augmentation     COSMETIC SURGERY      breast augmentation     EP ABLATION PVC N/A 11/26/2019    Procedure: EP ABLATION PVC;  Surgeon: Tony Castro MD;  Location:  HEART CARDIAC CATH LAB       ALLERGIES:   No Known Allergies    FAMILY HISTORY:  Family History   Problem Relation Age of Onset     Alcohol/Drug Father      Depression Father      Alcohol/Drug Maternal Grandmother      Kidney Disease Maternal Grandmother      Depression Maternal Grandmother      Lipids Maternal Grandmother      Endocrine Disease Maternal Grandmother      Heart Disease Maternal Grandmother      Coronary Artery Disease Maternal Grandmother      Hypertension Maternal Grandmother      Cerebrovascular Disease Maternal Grandmother      Thyroid Disease Maternal Grandmother      Known Genetic Syndrome Maternal Grandmother         polycystic kidney disease     Alcohol/Drug Maternal Grandfather      Depression Maternal Grandfather      Diabetes Maternal Grandfather      Hyperlipidemia Maternal Grandfather      Alcohol/Drug Paternal Grandfather      Depression Paternal Grandfather      Depression Paternal Grandmother      Kidney Disease Mother      Endocrine Disease Mother      Thyroid Disease Mother      Thyroid Disease Mother         hypothyroidism     Known Genetic Syndrome Mother         polycystic kidney disease     Cancer No family hx of      Glaucoma No family hx of      Macular Degeneration No family hx of          SOCIAL  HISTORY:  Social History     Tobacco Use     Smoking status: Never     Smokeless tobacco: Never   Substance Use Topics     Alcohol use: Yes     Comment: social use only     Drug use: No       ROS:   Constitutional: No fever, chills, or sweats. Weight stable.   Cardiovascular: As per HPI.     Exam:  /62 (BP Location: Right arm, Patient Position: Sitting, Cuff Size: Adult Regular)   Pulse 99   SpO2 100%   GENERAL APPEARANCE: alert and no distress  HEENT: no icterus, no central cyanosis  LYMPH/NECK: no adenopathy, no asymmetry, JVP not elevated  CARDIOVASCULAR: regular rhythm, normal S1, S2, no S3 or S4 and no murmur, click or rub, precordium quiet with normal PMI.  GI: Pregnant  EXTREMITIES:  no edema  NEURO: alert, normal speech,and affect.   SKIN: no ecchymoses, no rashes     I have reviewed the labs and personally reviewed the imaging below (EKG, zio patch and echo) and made my comment in the assessment and plan.    Labs:  CBC RESULTS:   Lab Results   Component Value Date    WBC 6.0 11/26/2019    RBC 4.46 11/26/2019    HGB 13.8 11/26/2019    HCT 39.9 11/26/2019    MCV 90 11/26/2019    MCH 30.9 11/26/2019    MCHC 34.6 11/26/2019    RDW 11.7 11/26/2019     11/26/2019       BMP RESULTS:  Lab Results   Component Value Date     11/26/2019    POTASSIUM 4.2 11/26/2019    CHLORIDE 108 11/26/2019    CO2 24 11/26/2019    ANIONGAP 6 11/26/2019    GLC 91 11/26/2019    BUN 9 11/26/2019    CR 0.69 11/26/2019    GFRESTIMATED >90 11/26/2019    GFRESTBLACK >90 11/26/2019    HELADIO 9.0 11/26/2019      Echocardiogram 10/28/2022  Global and regional left ventricular function is normal with an EF of 60-65%.  Global right ventricular function is normal.     This study was compared with the study from 2019. No significant changes  noted.    Holter monitor for 48 hours 7/9/2019: Frequent PVCs 27%    Echocardiogram 7/12/2019  No structural source of arrhythmia is identified.  Left and right ventricular function, chamber  size, wall motion, and wall  thickness are normal.The LVEF is 55-60%.  No significant valvular abnormalities were noted.  This study was compared with the study from 3/23/18: There has been no change.    Cardiac MRI 6/2/2014  1.  Normal left ventricular size and systolic function with a  calculated ejection fraction of  61 %.  2.  Normal right ventricular size and systolic function with a  calculated ejection fraction of 58%.   3.  On delayed enhancement imaging, there is no abnormal  hyperenhancement to suggest myocardial scar/inflammation/infiltration    Echo 10/28/2022  Global and regional left ventricular function is normal with an EF of 60-65%.  Global right ventricular function is normal.     This study was compared with the study from 2019. No significant changes  Noted.    14 day [facility] 2019        Assessment and Plan:     Ms. Ursula De La Cruz is a 32 year old  female with PMH significant for frequent PVCs (34% burden on Holter monitor in 2019) and hypothyroidism.  Patient underwent EP study in 2020 which did not elicit any PVCs.      She is presenting to clinic for palpitations.  She is 34 weeks pregnant.  Patient is an ICU nurse.  She obtained EKG rhythm strip during the symptom which shows sinus tachycardia (patient brought the rhythm strip to clinic today)    1. SOB, palpitations:  -Mostly when she is standing over the last few weeks.  EKG rhythm strip shows sinus tachycardia which is likely secondary to pregnancy.  Do not suspect any pathologic arrhythmia.  Recent echocardiogram is unremarkable.  Physical exam and vital signs are unremarkable today.  Resting heart rate is normal.    -Recommend attention to hydration.  -No further work-up required at this time.    2. History of symptomatic PVCs  -Appears to have resolved since 2020.    Return to clinic as needed.    Discussed with Dr. Janak Tatum MD  Cardiovascular disease fellow  [facility]  [place]  [phone]  11/01/2022 8:31 AM     Attending note  11/2/2022      I have personally seen and examined the patient in clinic today. I agree with the fellow's findings and plan of care as documented in the note.       I personally reviewed vital signs, medications, labs, imaging, and ECG.     The history and physical findings are accurate as recorded. My additional findings, if any, have been incorporated into the body of the note. The assessment and plans outlined reflect our joint decision making.     Total time spent 25 minutes including prior charting, face-to-face clinic visit, review of outside hospital medical records and medical documentation.     Rinku FOWLER MD  [facility] Division of Cardiology  Pager [phone]

## 2022-11-29 ENCOUNTER — LAB (OUTPATIENT)
Dept: LAB | Facility: CLINIC | Age: 32
End: 2022-11-29
Payer: COMMERCIAL

## 2022-11-29 DIAGNOSIS — Z20.822 ENCOUNTER FOR LABORATORY TESTING FOR COVID-19 VIRUS: ICD-10-CM

## 2022-11-29 PROCEDURE — U0003 INFECTIOUS AGENT DETECTION BY NUCLEIC ACID (DNA OR RNA); SEVERE ACUTE RESPIRATORY SYNDROME CORONAVIRUS 2 (SARS-COV-2) (CORONAVIRUS DISEASE [COVID-19]), AMPLIFIED PROBE TECHNIQUE, MAKING USE OF HIGH THROUGHPUT TECHNOLOGIES AS DESCRIBED BY CMS-2020-01-R: HCPCS

## 2022-11-29 PROCEDURE — U0005 INFEC AGEN DETEC AMPLI PROBE: HCPCS

## 2022-11-30 ENCOUNTER — HOSPITAL ENCOUNTER (INPATIENT)
Facility: CLINIC | Age: 32
LOS: 1 days | Discharge: HOME HEALTH | End: 2022-12-01
Attending: STUDENT IN AN ORGANIZED HEALTH CARE EDUCATION/TRAINING PROGRAM | Admitting: STUDENT IN AN ORGANIZED HEALTH CARE EDUCATION/TRAINING PROGRAM
Payer: COMMERCIAL

## 2022-11-30 LAB
ABO/RH(D): NORMAL
ANTIBODY SCREEN: NEGATIVE
ERYTHROCYTE [DISTWIDTH] IN BLOOD BY AUTOMATED COUNT: 11.9 % (ref 10–15)
HCT VFR BLD AUTO: 35.8 % (ref 35–47)
HGB BLD-MCNC: 12.1 G/DL (ref 11.7–15.7)
MCH RBC QN AUTO: 31.3 PG (ref 26.5–33)
MCHC RBC AUTO-ENTMCNC: 33.8 G/DL (ref 31.5–36.5)
MCV RBC AUTO: 93 FL (ref 78–100)
PLATELET # BLD AUTO: 285 10E3/UL (ref 150–450)
RBC # BLD AUTO: 3.86 10E6/UL (ref 3.8–5.2)
SARS-COV-2 RNA RESP QL NAA+PROBE: NEGATIVE
SPECIMEN EXPIRATION DATE: NORMAL
T PALLIDUM AB SER QL: NONREACTIVE
WBC # BLD AUTO: 13.3 10E3/UL (ref 4–11)

## 2022-11-30 PROCEDURE — 86901 BLOOD TYPING SEROLOGIC RH(D): CPT | Performed by: STUDENT IN AN ORGANIZED HEALTH CARE EDUCATION/TRAINING PROGRAM

## 2022-11-30 PROCEDURE — 85014 HEMATOCRIT: CPT | Performed by: STUDENT IN AN ORGANIZED HEALTH CARE EDUCATION/TRAINING PROGRAM

## 2022-11-30 PROCEDURE — 722N000001 HC LABOR CARE VAGINAL DELIVERY SINGLE

## 2022-11-30 PROCEDURE — 86780 TREPONEMA PALLIDUM: CPT | Performed by: STUDENT IN AN ORGANIZED HEALTH CARE EDUCATION/TRAINING PROGRAM

## 2022-11-30 PROCEDURE — 86850 RBC ANTIBODY SCREEN: CPT | Performed by: STUDENT IN AN ORGANIZED HEALTH CARE EDUCATION/TRAINING PROGRAM

## 2022-11-30 PROCEDURE — 250N000013 HC RX MED GY IP 250 OP 250 PS 637: Performed by: STUDENT IN AN ORGANIZED HEALTH CARE EDUCATION/TRAINING PROGRAM

## 2022-11-30 PROCEDURE — 59409 OBSTETRICAL CARE: CPT | Performed by: OBSTETRICS & GYNECOLOGY

## 2022-11-30 PROCEDURE — 36415 COLL VENOUS BLD VENIPUNCTURE: CPT | Performed by: STUDENT IN AN ORGANIZED HEALTH CARE EDUCATION/TRAINING PROGRAM

## 2022-11-30 PROCEDURE — 250N000009 HC RX 250: Performed by: STUDENT IN AN ORGANIZED HEALTH CARE EDUCATION/TRAINING PROGRAM

## 2022-11-30 PROCEDURE — 120N000012 HC R&B POSTPARTUM

## 2022-11-30 PROCEDURE — 0KQM0ZZ REPAIR PERINEUM MUSCLE, OPEN APPROACH: ICD-10-PCS | Performed by: OBSTETRICS & GYNECOLOGY

## 2022-11-30 RX ORDER — NALOXONE HYDROCHLORIDE 0.4 MG/ML
0.2 INJECTION, SOLUTION INTRAMUSCULAR; INTRAVENOUS; SUBCUTANEOUS
Status: DISCONTINUED | OUTPATIENT
Start: 2022-11-30 | End: 2022-11-30 | Stop reason: HOSPADM

## 2022-11-30 RX ORDER — LEVOTHYROXINE SODIUM 50 UG/1
50 TABLET ORAL DAILY
COMMUNITY

## 2022-11-30 RX ORDER — ACETAMINOPHEN 325 MG/1
650 TABLET ORAL EVERY 4 HOURS PRN
Status: DISCONTINUED | OUTPATIENT
Start: 2022-11-30 | End: 2022-11-30 | Stop reason: HOSPADM

## 2022-11-30 RX ORDER — PROCHLORPERAZINE 25 MG
25 SUPPOSITORY, RECTAL RECTAL EVERY 12 HOURS PRN
Status: DISCONTINUED | OUTPATIENT
Start: 2022-11-30 | End: 2022-11-30 | Stop reason: HOSPADM

## 2022-11-30 RX ORDER — ACETAMINOPHEN 325 MG/1
650 TABLET ORAL EVERY 4 HOURS PRN
Status: DISCONTINUED | OUTPATIENT
Start: 2022-11-30 | End: 2022-12-01 | Stop reason: HOSPADM

## 2022-11-30 RX ORDER — OXYTOCIN/0.9 % SODIUM CHLORIDE 30/500 ML
340 PLASTIC BAG, INJECTION (ML) INTRAVENOUS CONTINUOUS PRN
Status: DISCONTINUED | OUTPATIENT
Start: 2022-11-30 | End: 2022-11-30 | Stop reason: HOSPADM

## 2022-11-30 RX ORDER — CARBOPROST TROMETHAMINE 250 UG/ML
250 INJECTION, SOLUTION INTRAMUSCULAR
Status: DISCONTINUED | OUTPATIENT
Start: 2022-11-30 | End: 2022-11-30 | Stop reason: HOSPADM

## 2022-11-30 RX ORDER — CITRIC ACID/SODIUM CITRATE 334-500MG
30 SOLUTION, ORAL ORAL
Status: DISCONTINUED | OUTPATIENT
Start: 2022-11-30 | End: 2022-11-30 | Stop reason: HOSPADM

## 2022-11-30 RX ORDER — TRANEXAMIC ACID 10 MG/ML
1 INJECTION, SOLUTION INTRAVENOUS EVERY 30 MIN PRN
Status: DISCONTINUED | OUTPATIENT
Start: 2022-11-30 | End: 2022-12-01 | Stop reason: HOSPADM

## 2022-11-30 RX ORDER — KETOROLAC TROMETHAMINE 30 MG/ML
30 INJECTION, SOLUTION INTRAMUSCULAR; INTRAVENOUS
Status: DISCONTINUED | OUTPATIENT
Start: 2022-11-30 | End: 2022-12-01 | Stop reason: HOSPADM

## 2022-11-30 RX ORDER — OXYTOCIN 10 [USP'U]/ML
10 INJECTION, SOLUTION INTRAMUSCULAR; INTRAVENOUS
Status: DISCONTINUED | OUTPATIENT
Start: 2022-11-30 | End: 2022-12-01 | Stop reason: HOSPADM

## 2022-11-30 RX ORDER — OXYTOCIN/0.9 % SODIUM CHLORIDE 30/500 ML
340 PLASTIC BAG, INJECTION (ML) INTRAVENOUS CONTINUOUS PRN
Status: DISCONTINUED | OUTPATIENT
Start: 2022-11-30 | End: 2022-12-01 | Stop reason: HOSPADM

## 2022-11-30 RX ORDER — TRANEXAMIC ACID 10 MG/ML
1 INJECTION, SOLUTION INTRAVENOUS EVERY 30 MIN PRN
Status: DISCONTINUED | OUTPATIENT
Start: 2022-11-30 | End: 2022-11-30 | Stop reason: HOSPADM

## 2022-11-30 RX ORDER — LEVOTHYROXINE SODIUM 50 UG/1
50 TABLET ORAL
Status: DISCONTINUED | OUTPATIENT
Start: 2022-11-30 | End: 2022-12-01 | Stop reason: HOSPADM

## 2022-11-30 RX ORDER — SODIUM CHLORIDE, SODIUM LACTATE, POTASSIUM CHLORIDE, CALCIUM CHLORIDE 600; 310; 30; 20 MG/100ML; MG/100ML; MG/100ML; MG/100ML
INJECTION, SOLUTION INTRAVENOUS CONTINUOUS
Status: DISCONTINUED | OUTPATIENT
Start: 2022-11-30 | End: 2022-11-30 | Stop reason: HOSPADM

## 2022-11-30 RX ORDER — POLYETHYLENE GLYCOL 3350 17 G/17G
17 POWDER, FOR SOLUTION ORAL DAILY
Status: DISCONTINUED | OUTPATIENT
Start: 2022-11-30 | End: 2022-12-01 | Stop reason: HOSPADM

## 2022-11-30 RX ORDER — OXYTOCIN 10 [USP'U]/ML
10 INJECTION, SOLUTION INTRAMUSCULAR; INTRAVENOUS
Status: DISCONTINUED | OUTPATIENT
Start: 2022-11-30 | End: 2022-11-30 | Stop reason: HOSPADM

## 2022-11-30 RX ORDER — HYDROCORTISONE 25 MG/G
CREAM TOPICAL 3 TIMES DAILY PRN
Status: DISCONTINUED | OUTPATIENT
Start: 2022-11-30 | End: 2022-12-01 | Stop reason: HOSPADM

## 2022-11-30 RX ORDER — IBUPROFEN 400 MG/1
800 TABLET, FILM COATED ORAL
Status: DISCONTINUED | OUTPATIENT
Start: 2022-11-30 | End: 2022-12-01 | Stop reason: HOSPADM

## 2022-11-30 RX ORDER — CARBOPROST TROMETHAMINE 250 UG/ML
250 INJECTION, SOLUTION INTRAMUSCULAR
Status: DISCONTINUED | OUTPATIENT
Start: 2022-11-30 | End: 2022-12-01 | Stop reason: HOSPADM

## 2022-11-30 RX ORDER — MISOPROSTOL 200 UG/1
800 TABLET ORAL
Status: DISCONTINUED | OUTPATIENT
Start: 2022-11-30 | End: 2022-11-30 | Stop reason: HOSPADM

## 2022-11-30 RX ORDER — PROCHLORPERAZINE MALEATE 5 MG
10 TABLET ORAL EVERY 6 HOURS PRN
Status: DISCONTINUED | OUTPATIENT
Start: 2022-11-30 | End: 2022-11-30 | Stop reason: HOSPADM

## 2022-11-30 RX ORDER — ONDANSETRON 2 MG/ML
4 INJECTION INTRAMUSCULAR; INTRAVENOUS EVERY 6 HOURS PRN
Status: DISCONTINUED | OUTPATIENT
Start: 2022-11-30 | End: 2022-11-30 | Stop reason: HOSPADM

## 2022-11-30 RX ORDER — METOCLOPRAMIDE 10 MG/1
10 TABLET ORAL EVERY 6 HOURS PRN
Status: DISCONTINUED | OUTPATIENT
Start: 2022-11-30 | End: 2022-11-30 | Stop reason: HOSPADM

## 2022-11-30 RX ORDER — METOCLOPRAMIDE HYDROCHLORIDE 5 MG/ML
10 INJECTION INTRAMUSCULAR; INTRAVENOUS EVERY 6 HOURS PRN
Status: DISCONTINUED | OUTPATIENT
Start: 2022-11-30 | End: 2022-11-30 | Stop reason: HOSPADM

## 2022-11-30 RX ORDER — MISOPROSTOL 200 UG/1
400 TABLET ORAL
Status: DISCONTINUED | OUTPATIENT
Start: 2022-11-30 | End: 2022-12-01 | Stop reason: HOSPADM

## 2022-11-30 RX ORDER — NALOXONE HYDROCHLORIDE 0.4 MG/ML
0.4 INJECTION, SOLUTION INTRAMUSCULAR; INTRAVENOUS; SUBCUTANEOUS
Status: DISCONTINUED | OUTPATIENT
Start: 2022-11-30 | End: 2022-11-30 | Stop reason: HOSPADM

## 2022-11-30 RX ORDER — IBUPROFEN 400 MG/1
800 TABLET, FILM COATED ORAL EVERY 6 HOURS PRN
Status: DISCONTINUED | OUTPATIENT
Start: 2022-11-30 | End: 2022-12-01 | Stop reason: HOSPADM

## 2022-11-30 RX ORDER — THYROID 60 MG/1
60 TABLET ORAL DAILY
Status: DISCONTINUED | OUTPATIENT
Start: 2022-12-01 | End: 2022-12-01 | Stop reason: HOSPADM

## 2022-11-30 RX ORDER — MODIFIED LANOLIN
OINTMENT (GRAM) TOPICAL
Status: DISCONTINUED | OUTPATIENT
Start: 2022-11-30 | End: 2022-12-01 | Stop reason: HOSPADM

## 2022-11-30 RX ORDER — METHYLERGONOVINE MALEATE 0.2 MG/ML
200 INJECTION INTRAVENOUS
Status: DISCONTINUED | OUTPATIENT
Start: 2022-11-30 | End: 2022-12-01 | Stop reason: HOSPADM

## 2022-11-30 RX ORDER — DOCUSATE SODIUM 100 MG/1
100 CAPSULE, LIQUID FILLED ORAL DAILY
Status: DISCONTINUED | OUTPATIENT
Start: 2022-11-30 | End: 2022-12-01 | Stop reason: HOSPADM

## 2022-11-30 RX ORDER — THYROID 60 MG/1
60 TABLET ORAL DAILY
Status: ON HOLD | COMMUNITY
End: 2022-12-01

## 2022-11-30 RX ORDER — ONDANSETRON 4 MG/1
4 TABLET, ORALLY DISINTEGRATING ORAL EVERY 6 HOURS PRN
Status: DISCONTINUED | OUTPATIENT
Start: 2022-11-30 | End: 2022-11-30 | Stop reason: HOSPADM

## 2022-11-30 RX ORDER — MISOPROSTOL 200 UG/1
800 TABLET ORAL
Status: DISCONTINUED | OUTPATIENT
Start: 2022-11-30 | End: 2022-12-01 | Stop reason: HOSPADM

## 2022-11-30 RX ORDER — OXYTOCIN/0.9 % SODIUM CHLORIDE 30/500 ML
100-340 PLASTIC BAG, INJECTION (ML) INTRAVENOUS CONTINUOUS PRN
Status: DISCONTINUED | OUTPATIENT
Start: 2022-11-30 | End: 2022-12-01 | Stop reason: HOSPADM

## 2022-11-30 RX ORDER — MISOPROSTOL 200 UG/1
400 TABLET ORAL
Status: DISCONTINUED | OUTPATIENT
Start: 2022-11-30 | End: 2022-11-30 | Stop reason: HOSPADM

## 2022-11-30 RX ORDER — BISACODYL 10 MG
10 SUPPOSITORY, RECTAL RECTAL DAILY PRN
Status: DISCONTINUED | OUTPATIENT
Start: 2022-11-30 | End: 2022-12-01 | Stop reason: HOSPADM

## 2022-11-30 RX ORDER — METHYLERGONOVINE MALEATE 0.2 MG/ML
200 INJECTION INTRAVENOUS
Status: DISCONTINUED | OUTPATIENT
Start: 2022-11-30 | End: 2022-11-30 | Stop reason: HOSPADM

## 2022-11-30 RX ADMIN — IBUPROFEN 800 MG: 400 TABLET, FILM COATED ORAL at 19:12

## 2022-11-30 RX ADMIN — ACETAMINOPHEN 650 MG: 325 TABLET, FILM COATED ORAL at 19:12

## 2022-11-30 RX ADMIN — DOCUSATE SODIUM 100 MG: 100 CAPSULE, LIQUID FILLED ORAL at 08:47

## 2022-11-30 RX ADMIN — IBUPROFEN 800 MG: 400 TABLET, FILM COATED ORAL at 12:58

## 2022-11-30 RX ADMIN — ACETAMINOPHEN 650 MG: 325 TABLET, FILM COATED ORAL at 15:40

## 2022-11-30 RX ADMIN — POLYETHYLENE GLYCOL 3350 17 G: 17 POWDER, FOR SOLUTION ORAL at 08:48

## 2022-11-30 RX ADMIN — LIDOCAINE HYDROCHLORIDE 20 ML: 10 INJECTION, SOLUTION EPIDURAL; INFILTRATION; INTRACAUDAL; PERINEURAL at 03:56

## 2022-11-30 RX ADMIN — ACETAMINOPHEN 650 MG: 325 TABLET, FILM COATED ORAL at 10:37

## 2022-11-30 RX ADMIN — Medication 340 ML/HR: at 03:53

## 2022-11-30 RX ADMIN — IBUPROFEN 800 MG: 400 TABLET, FILM COATED ORAL at 04:49

## 2022-11-30 ASSESSMENT — ACTIVITIES OF DAILY LIVING (ADL)
ADLS_ACUITY_SCORE: 18

## 2022-11-30 NOTE — L&D DELIVERY NOTE
Delivery Date: 2022    Reyna is a 32-year-old G3, who presented to Labor and Delivery at 39+0 weeks' gestation in active labor. As the in-house physician, I was called to attend delivery as she had spontaneous rupture of membranes, was completely dilated and +1 station. 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  On my arrival Reyna was positioned in the dorsal lithotomy position and nitrous oxide was employed.  With 1 contraction, mother delivered infant in left occiput anterior position without difficulty.  No nuchal cords were noted and remainder of infant was delivered and placed on maternal abdomen.  Cord clamping was delayed by 1 minute.  Placenta was delivered spontaneously and intact.  Perineum was inspected and found to have sustained a second-degree perineal laceration.  Local anesthetic was placed and laceration was repaired in the usual fashion using a 3-0 Vicryl suture.  Both mother and infant were doing very well following delivery and are stable for recovery.  Fundus was firm and bleeding minimal with estimated blood loss of 150 mL. Shortly after delivery, attending physician, Dr. Acevedo, arrived.     Rosalva Jaimes MD        D: 2022   T: 2022   MT: [id number]    Name:     REYNA ARAUJO  MRN:      [id number]        Account:       [id number]   :      1990           Delivery Date: 2022     Document: [id number]

## 2022-11-30 NOTE — PROVIDER NOTIFICATION
11/30/22 0341   Provider Notification   Provider Name/Title Dr Acevedo   Method of Notification Phone   Request Attend Delivery   Notification Reason Patient Arrived;SVE;Membrane Status     0341  Dr Acevedo paged with return call received.  Update included but not limited to:  Patient arrived 8-9 cm, third baby, intact membranes that are bulging, unable to determine presenting part as do not want to break bag of water - no reason to suspect not vertex.    Plan per provider on her way to attend delivery is 15 minutes away, call in-house ob if needed.

## 2022-11-30 NOTE — H&P
OB HISTORY AND PHYSICAL    Patient Name: Ursula De La Cruz   Admit Date: 1130  MR #: [id number]   Acct #: [id number]   : 1990     Chief Complaint/Reason for Visit: contractions    History of Present Illness: Ursula De La Cruz  is a 32 year old  at 39w1d here for contractions that started early this morning, have become stronger, closer together. She is feeling more pelvic pressure. +FM, denies VB or LOF.    Ursula's prenatal care is notable for low risk NIPT, surprise fetal sex, nuchal translucency wnl. Uterine synechiae noted at anatomy US, sent to [facility] for f/u, confirmed findings, serial growth USs wnl. Failed 1hr gtt, declined 3hr, postprandial glucose monitoring wnl. S/p tdap and flu vacines. GBS negative. Hx hashimoto thyroiditis, hx small left carotid aneurysm, hx PVCs which have resolved.    Review of Systems:  General: denies fevers  CV: denies CP  Pulm: denies SOB  Neuro: denies HA  Abd: denies N/V  Gyn: denies vaginal discharge  Skin: denies rashes  MSK: denies calf pain  Psych: denies depression     History:   OB History    Para Term  AB Living   3 2 2 0 0 1   SAB IAB Ectopic Multiple Live Births   0 0 0 0 1      # Outcome Date GA Lbr Mason/2nd Weight Sex Delivery Anes PTL Lv   3 Current            2 Term         MAURA   1 Term                  Past Medical History:   Diagnosis Date     Acne vulgaris      Coronary artery disease     PVCs-asymptomatic. not on any meds for this     Goiter, unspecified 3/17/2015     Hashimoto's thyroiditis 2016       Past Surgical History:   Procedure Laterality Date     BACK SURGERY      L1 compression fracture, no surgery     BREAST SURGERY      elective augmentation     COSMETIC SURGERY      breast augmentation     EP ABLATION PVC N/A 2019    Procedure: EP ABLATION PVC;  Surgeon: Tony Castro MD;  Location:  HEART CARDIAC CATH LAB       Family History   Problem Relation Age of Onset     Alcohol/Drug Father      Depression  Father      Alcohol/Drug Maternal Grandmother      Kidney Disease Maternal Grandmother      Depression Maternal Grandmother      Lipids Maternal Grandmother      Endocrine Disease Maternal Grandmother      Heart Disease Maternal Grandmother      Coronary Artery Disease Maternal Grandmother      Hypertension Maternal Grandmother      Cerebrovascular Disease Maternal Grandmother      Thyroid Disease Maternal Grandmother      Known Genetic Syndrome Maternal Grandmother         polycystic kidney disease     Alcohol/Drug Maternal Grandfather      Depression Maternal Grandfather      Diabetes Maternal Grandfather      Hyperlipidemia Maternal Grandfather      Alcohol/Drug Paternal Grandfather      Depression Paternal Grandfather      Depression Paternal Grandmother      Kidney Disease Mother      Endocrine Disease Mother      Thyroid Disease Mother      Thyroid Disease Mother         hypothyroidism     Known Genetic Syndrome Mother         polycystic kidney disease     Cancer No family hx of      Glaucoma No family hx of      Macular Degeneration No family hx of        Social History     Socioeconomic History     Marital status:      Spouse name: Not on file     Number of children: Not on file     Years of education: Not on file     Highest education level: Not on file   Occupational History     Occupation: RN     Employer: STUDENT     Employer: [employer],[address]   Tobacco Use     Smoking status: Never     Smokeless tobacco: Never   Substance and Sexual Activity     Alcohol use: Yes     Comment: social use only     Drug use: No     Sexual activity: Yes     Partners: Male     Birth control/protection: Pill   Other Topics Concern     Parent/sibling w/ CABG, MI or angioplasty before 65F 55M? No   Social History Narrative    Caffeine intake/servings daily - 0-1    Calcium intake/servings daily - 2    Exercise 2 times weekly - describe cardio, strength    Sunscreen used - Yes    Seatbelts used -  Yes    Guns stored in the home - No    Self Breast Exam - No    Pap test up to date -  Yes    Eye exam up to date -  No    Dental exam up to date -  Yes    DEXA scan up to date -  Not Applicable    Flex Sig/Colonoscopy up to date -  Not Applicable    Mammography up to date -  Not Applicable    Immunizations reviewed and up to date - ?    Abuse: Current or Past (Physical, Sexual or Emotional) - No    Do you feel safe in your environment - Yes    Do you cope well with stress - Yes    Do you suffer from insomnia - No    Last updated by: Francesca Blake  12/15/2008    Francesca Blake M.A.                     Social Determinants of Health     Financial Resource Strain: Not on file   Food Insecurity: Not on file   Transportation Needs: Not on file   Physical Activity: Not on file   Stress: Not on file   Social Connections: Not on file   Intimate Partner Violence: Not on file   Housing Stability: Not on file       Allergy Information:        I have reviewed the patient's allergies.      @    Home Medications:   No current outpatient medications on file.       Physical Examination:  Vitals:       Exam:  General: no acute distress  Head: normocephalic, atraumatic  Eyes: EOMI  CV: pulses intact  Pulm: no increased effort  Neuro: CN II-XII grossly intact  Abd: gravid, soft, NTTP  Gyn: 10/100/bulging bag  Skin: no rashes  MSK: no calf tenderness  Psych: AOx3, appropriate mood/affect    Fetus: FHT: 135, moderate variability, positive accels, no decels    Laboratory and Additional Data Reviewed:  Any associated labs, path, imaging personally reviewed  No results found for any visits on 22.      Assessment and Plan: Ursula De La Cruz is a 32 year old  at 39w1d here for labor    Labor  - pt presented 8-9cm with bulging bag, progressed and delivered by in-house OBGYN, Dr. Jaimes, within 10 minutes of patient arrival to hospital  - s/p uncomplicated precipitous vaginal delivery  - GBS neg    Hashimoto thyroiditis  - home  meds ordered    Dispo: admit to L&D for postpartum care      Ashly Acevedo MD  [facility] YENNY Blancas PA  11/30/2022, 4:22 AM

## 2022-11-30 NOTE — PROGRESS NOTES
S: Vaginal Delivery/Transfer Note  B:  39.1 wks, delivered viable baby boy at 0349, second degree laceration.  mls. Mother used nitrous oxide for pain control.   A: Mother given ibuprofen for pain control at 0450. Mother ambulated to bathroom and voided. Mother nursing infant independently. Pt placed in wheelchair and transferred to room 416.  Report to LIUDMILA Arndt

## 2022-11-30 NOTE — L&D DELIVERY NOTE
of viable male at 0349   Baby's weight not yet recorded lbs     Apgars 8, 9     Placenta delivered spontaneously and intact; 3vc   2nd degree perineal laceration repaired    Primary OB: Clinic Magalis Jaimes MD

## 2022-11-30 NOTE — PLAN OF CARE
Feels well. Vitals stable. Tylenol for pain.  Up and about.Working on breast feeding, pumping and hand expression. Will continue to monitor.

## 2022-12-01 VITALS
SYSTOLIC BLOOD PRESSURE: 122 MMHG | DIASTOLIC BLOOD PRESSURE: 80 MMHG | OXYGEN SATURATION: 100 % | TEMPERATURE: 97.3 F | HEART RATE: 56 BPM | RESPIRATION RATE: 18 BRPM

## 2022-12-01 PROCEDURE — 250N000013 HC RX MED GY IP 250 OP 250 PS 637: Performed by: STUDENT IN AN ORGANIZED HEALTH CARE EDUCATION/TRAINING PROGRAM

## 2022-12-01 RX ADMIN — IBUPROFEN 800 MG: 400 TABLET, FILM COATED ORAL at 07:52

## 2022-12-01 RX ADMIN — IBUPROFEN 800 MG: 400 TABLET, FILM COATED ORAL at 14:13

## 2022-12-01 RX ADMIN — LEVOTHYROXINE SODIUM 50 MCG: 50 TABLET ORAL at 07:53

## 2022-12-01 RX ADMIN — ACETAMINOPHEN 650 MG: 325 TABLET, FILM COATED ORAL at 07:52

## 2022-12-01 RX ADMIN — IBUPROFEN 800 MG: 400 TABLET, FILM COATED ORAL at 00:26

## 2022-12-01 RX ADMIN — ACETAMINOPHEN 650 MG: 325 TABLET, FILM COATED ORAL at 00:27

## 2022-12-01 RX ADMIN — POLYETHYLENE GLYCOL 3350 17 G: 17 POWDER, FOR SOLUTION ORAL at 09:21

## 2022-12-01 RX ADMIN — ACETAMINOPHEN 650 MG: 325 TABLET, FILM COATED ORAL at 14:13

## 2022-12-01 RX ADMIN — DOCUSATE SODIUM 100 MG: 100 CAPSULE, LIQUID FILLED ORAL at 07:52

## 2022-12-01 ASSESSMENT — ACTIVITIES OF DAILY LIVING (ADL)
ADLS_ACUITY_SCORE: 18

## 2022-12-01 NOTE — LACTATION NOTE
Initial and discharge visit with Mother and Father and baby boy.  This is Mother's third breast feeding experience.  She struggled with breast feeding both of her sons and has a history of low milk supply.  They both had tongue ties as well.  Mother states baby boy is breast feeding well but it is just painful.  Baby boy has tongue tie.  Mother says it feels like he is chomping at the breast.  Mother and Father have an appointment with a pediatric dentist tomorrow morning to have him assessed and possibly have a frenulectomy.    Mother hand expresses after breast feeding and spoon/cup feeds EBM to baby boy.  She is also occasionally pumping.     LC educated Mother on the importance of making sure infant is getting a deep latch with feedings versus a shallow latch.    Breast feeding general information reviewed.   Ready for discharge.  Appreciative of visit.  No further questions at this time.    Encouraged outpatient lactation visit if she feels like her milk is not coming in and follow up after baby boy is assessed by pediatric dentist.    Jane Pérez RN, IBCLC

## 2022-12-01 NOTE — PROGRESS NOTES
Ursula De La Cruz  2022   PPD 1 s/p     S: 32 year old  delivered at 39w1d   Doing well without complaints.  Sore but medication helping.   Lochia minimal.   Ambulating.  Urinating without issues.  Breastfeeding. Had issues with supply with her first two so has been pumping or hand expressing after most feeds.   Desires discharge today if circumcision and everything able to be taken care of for the baby by a reasonable time.    O: /80   Pulse 56   Temp 97.3  F (36.3  C) (Oral)   Resp 18   SpO2 100%   Breastfeeding Unknown   Gen: NAD  Abd: soft, NT, ND, FF below U  Ext: NT, nonedematous    A/P:  32 year old  PPD1 s/p   - ibuprofen and acetaminophen PRN pain  - support breastfeeding  - monitor lochia  - encourage ambulation  - regular diet  - routine PP care  - plan discharge to home today     Rosalva Bucio MD  Text Page (8am - 5pm)  2022 0805

## 2022-12-01 NOTE — PLAN OF CARE
VSS. Fundus firm and midline. Scant flow. Pain 4/10, pain controled with tylenol and ibuprofen per MAR. Breastfeeding. Ambulating free of dizziness or lightheaded. Voiding without difficulty. Encouraged to call with needs, questions, or concerns.

## 2022-12-01 NOTE — PLAN OF CARE
VSS.  Pain well controlled with PRN tylenol and Ibuprofen. Heat packs given for cramping.  Up independently in room. Frequent voiding encouraged. Fundus midline and scant flow.  Working on breastfeeding  and  cares. Continue with plan of care and notify provider as needed.

## 2022-12-01 NOTE — DISCHARGE INSTRUCTIONS
Please call the office if you experience  - bleeding through more than a pad per hour persistently  - passage of blood clots greater than the size of christie  - abnormal vaginal discharge  - temperature greater than 100.4  - inability to tolerate food or fluid  - pain not controlled with oral medications  - persistent headache, vision changes, chest pain, shortness of breath, pain in the upper belly  - significant breast pain  - mood changes that affect your quality of life    Please make an appointment at [facility] in 6 weeks for a postpartum visit.  Postpartum Vaginal Delivery Instructions    Activity     Ask family and friends for help when you need it.  Do not place anything in your vagina for 6 weeks.  You are not restricted on other activities, but take it easy for a few weeks to allow your body to recover from delivery.  You are able to do any activities you feel up to that point.  No driving until you have stopped taking your pain medications (usually two weeks after delivery).     Call your health care provider if you have any of these symptoms:     Increased pain, swelling, redness, or fluid around your stiches from an episiotomy or perineal tear.  A fever above 100.4 F (38 C) with or without chills when placing a thermometer under your tongue.  You soak a sanitary pad with blood within 1 hour, or you see blood clots larger than a golf ball.  Bleeding that lasts more than 6 weeks.  Vaginal discharge that smells bad.  Severe pain, cramping or tenderness in your lower belly area.  A need to urinate more frequently (use the toilet more often), more urgently (use the toilet very quickly), or it burns when you urinate.  Nausea and vomiting.  Redness, swelling or pain around a vein in your leg.  Problems breastfeeding or a red or painful area on your breast.  Chest pain and cough or are gasping for air.  Problems coping with sadness, anxiety, or depression.  If you have any concerns about hurting yourself or  the baby, call your provider immediately.   You have questions or concerns after you return home.     Keep your hands clean:  Always wash your hands before touching your perineal area and stitches.  This helps reduce your risk of infection.  If your hands aren't dirty, you may use an alcohol hand-rub to clean your hands. Keep your nails clean and short.

## 2022-12-01 NOTE — DISCHARGE SUMMARY
[facility]    Discharge Summary  Obstetrics    Date of Admission:  2022  Date of Discharge:  2022  Discharging Provider: Rosalva Bucio MD    Discharge Diagnoses   S/p precipitous     History of Present Illness   Ursula De La Cruz is a 32 year old  who presented @ 39+1 in labor. She ultimately delivered with the in-house OB physician within 10 minutes of arrival to the hospital. She delivered a viable male infant weighing 7#8 oz with Apgars of 8/9.    Hospital Course   The patient's hospital course was unremarkable.  She recovered as anticipated and experienced no post-delivery complications.  On discharge, her pain was well controlled. Vaginal bleeding appropriate.  Voiding without difficulty.  Ambulating well and tolerating a normal diet.  No fevers.  Breastfeeding well.  Infant stable.  She was discharged on post-partum day 1.    Post-partum hemoglobin:   Hemoglobin   Date Value Ref Range Status   2022 12.1 11.7 - 15.7 g/dL Final   2019 13.8 11.7 - 15.7 g/dL Final       Rosalva Bucio MD    2022 0812    Discharge Disposition   Discharged to home   Condition at discharge: Stable    Primary Care Physician   Physician No Ref-Primary    Consultations This Hospital Stay   ANESTHESIOLOGY IP CONSULT  LACTATION IP CONSULT    Discharge Orders      Activity    Activity as tolerated     Reason for your hospital stay    Maternity care     Follow Up    Follow up with provider in 6 weeks for post-delivery check     Breast pump - Manual/Electric    Breast Pump Documentation:  Manual/Electric Pump: To support adequate breast milk production and nutrition for infant.     I, the undersigned, certify that the above prescribed supplies are medically necessary for this patient and is both reasonable and necessary in reference to accepted standards of medical and necessary in reference to accepted standards of medical practice in the treatment of this patient's  condition and is not prescribed as a convenience.     Diet    Resume previous diet     Discharge Medications   Current Discharge Medication List      CONTINUE these medications which have NOT CHANGED    Details   levothyroxine (SYNTHROID/LEVOTHROID) 50 MCG tablet Take 50 mcg by mouth daily      NATURE-THROID 32.5 MG TABS       Prenatal Vit-Fe Fumarate-FA (PRENATAL MULTIVITAMIN PLUS IRON) 27-0.8 MG TABS per tablet Take 1 tablet by mouth daily         STOP taking these medications       magnesium citrate solution Comments:   Reason for Stopping:             Allergies   No Known Allergies    Rosalva Bucio MD

## 2022-12-02 NOTE — PLAN OF CARE
D: VSS, assessments WDL. Tylenol and Ibuprofen for pain. Using IP and Tucks. Breastfeeding her baby boy. Nipples are tender, using lanolin cream. Baby is tongue tied and will follow up in clinic tomorrow for a frenotomy.   I: Pt. received complete discharge paperwork.  Pt. was given times of last dose for all discharge medications in writing on discharge medication sheets.  Discharge teaching included home medication, pain management, activity restrictions, postpartum cares, and signs and symptoms of infection.    A: Discharge outcomes on care plan met.  Mother states understanding and comfort with self care and follow up care.   P: Pt. Discharged.  Pt. was accompanied by  and left with personal belongings.  Home care sent.  Pt. to follow up with OB provider per discharge instructions.  Pt. had no further questions at the time of discharge and no unmet needs were identified.

## 2023-01-09 ENCOUNTER — OFFICE VISIT (OUTPATIENT)
Dept: VASCULAR SURGERY | Facility: CLINIC | Age: 33
End: 2023-01-09
Payer: COMMERCIAL

## 2023-01-09 DIAGNOSIS — I83.893 SYMPTOMATIC VARICOSE VEINS OF BOTH LOWER EXTREMITIES: Primary | ICD-10-CM

## 2023-01-09 PROCEDURE — 99203 OFFICE O/P NEW LOW 30 MIN: CPT | Performed by: SURGERY

## 2023-01-09 NOTE — PROGRESS NOTES
VEINSOLUTIONS CONSULTATION    HPI:    Ursula De La Cruz is a pleasant 32 year old female whom I have seen in the past, last in December 2015, for bilateral lower extremity varicose veins with pain.  She had a lower extremity venous competency study in September 2014, the details of which I am not aware as this result paper chart several years ago.  After that visit, she decided on conservative management which she has pursued with exercise, dietary measures, leg elevation and compression hose.    About 6 weeks ago, she had a healthy baby boy, the last baby that she is planning to have.  Her bilateral varicose veins were quite painful during her pregnancy and, after her to last pregnancies, have remained painful even after delivering her babies.  She describes pain as a pinching, burning, aching, heaviness and pruritus as well as throbbing after being on her feet for extended periods of time.  The pain is located in her thighs, calfs and feet and is associate with excessive fatigue.    The symptoms interfere with actives of daily living because it makes it hard for her to stand on her feet and her work as a pediatric intensive care unit nurse.  The pain is located in the back of her thighs as well.  She uses ibuprofen and Tylenol on an as-needed basis for her pain.    She has no history of deep vein thrombosis, superficial thrombophlebitis or hemorrhage.    Her family history significant for varicose veins.  There is no family history of venous thromboembolism.    PAST MEDICAL HISTORY:   Past Medical History:   Diagnosis Date     Acne vulgaris      Coronary artery disease     PVCs-asymptomatic. not on any meds for this     Goiter, unspecified 3/17/2015     Hashimoto's thyroiditis 12/21/2016       PAST SURGICAL HISTORY:   Past Surgical History:   Procedure Laterality Date     BACK SURGERY  2007    L1 compression fracture, no surgery     BREAST SURGERY  2014    elective augmentation     COSMETIC SURGERY      breast  augmentation     EP ABLATION PVC N/A 11/26/2019    Procedure: EP ABLATION PVC;  Surgeon: Tony Csatro MD;  Location:  [facility]       FAMILY HISTORY:   Family History   Problem Relation Age of Onset     Alcohol/Drug Father      Depression Father      Alcohol/Drug Maternal Grandmother      Kidney Disease Maternal Grandmother      Depression Maternal Grandmother      Lipids Maternal Grandmother      Endocrine Disease Maternal Grandmother      Heart Disease Maternal Grandmother      Coronary Artery Disease Maternal Grandmother      Hypertension Maternal Grandmother      Cerebrovascular Disease Maternal Grandmother      Thyroid Disease Maternal Grandmother      Known Genetic Syndrome Maternal Grandmother         polycystic kidney disease     Alcohol/Drug Maternal Grandfather      Depression Maternal Grandfather      Diabetes Maternal Grandfather      Hyperlipidemia Maternal Grandfather      Alcohol/Drug Paternal Grandfather      Depression Paternal Grandfather      Depression Paternal Grandmother      Kidney Disease Mother      Endocrine Disease Mother      Thyroid Disease Mother      Thyroid Disease Mother         hypothyroidism     Known Genetic Syndrome Mother         polycystic kidney disease     Cancer No family hx of      Glaucoma No family hx of      Macular Degeneration No family hx of        SOCIAL HISTORY:   Social History     Tobacco Use     Smoking status: Never     Smokeless tobacco: Never   Substance Use Topics     Alcohol use: Yes     Comment: social use only       REVIEW OF SYSTEMS: Review Of Systems  Skin: itching  Eyes: negative, glasses  Ears/Nose/Throat: negative  Respiratory: No shortness of breath, dyspnea on exertion, cough, or hemoptysis  Cardiovascular: negative  Gastrointestinal: negative  Genitourinary: negative  Musculoskeletal: Leg pain, leg swelling  Neurologic: negative  Psychiatric: negative  Hematologic/Lymphatic/Immunologic: negative  Endocrine:  Hypothyroidism      Vital signs:  There were no vitals taken for this visit.    Current Outpatient Medications   Medication Sig Dispense Refill     levothyroxine (SYNTHROID/LEVOTHROID) 50 MCG tablet Take 50 mcg by mouth daily       NATURE-THROID 32.5 MG TABS 60 mg         PHYSICAL EXAM:  General: Pleasant, NAD.   HEENT: Normocephalic, atraumatic, external ears and nose normal.   Respiratory: Normal respiratory effort.   Cardiovascular: Pulse is regular.   Musculoskeletal: Gait and station normal.  The joints of her fingers and toes without deformity.  There is no cyanosis of her nailbeds.   EXTREMITIES: Right lower extremity: 4 to 6 mm varicosities coursing from the proximal posteromedial right thigh, down the medial thigh and onto the medial calf.  This extends laterally across the pretibial area onto the anterolateral leg down to the ankle.  Trace edema of the right ankle.  No significant stasis changes.  Clusters of telangiectasias over the anterolateral leg and distal medial thigh.    Left extremity: 4 mm varicosities coursing down the posteromedial left thigh and down to just above the knee.  There is a cluster of telangiectasias in the proximal posterior calf.  3 to 4 mm varicosities over the left medial calf.  Clusters of intense telangiectasias over the distal anterolateral left leg.  Trace edema of the left ankle.    PULSES: R/L (3=normal pulse, 0=no palpable pulse) dorsalis pedis: 3/3; posterior tibial: 3/3.      Neurologic: Grossly normal  Psychiatric: Mood, affect, judgment and insight are normal     ASSESSMENT:  Bilateral lower extremity pain with varicose veins, symptoms recalcitrant to conservative measures and interfering with actives of daily living.  She has been pursuing conservative measures for some 7 years.  These have slowly worsened.    We discussed the lower extremity vein anatomy, the pathophysiology of venous insufficiency and the option of continued conservative measures with risks of  superficial thrombophlebitis, bleeding and progression of the disease process.    If she wishes to investigate this further, we will need bilateral lower extremity venous competency studies.  Based on findings, she may be a candidate for office-based, endovenous ablation of axial incompetence.  We discussed details of endovenous ablation including risks of deep vein thrombosis, bleeding, infection, nerve injury and recurrent varicose veins.  She voiced understanding of our discussion and her questions answered.    She inquired about possibility of treating her spider telangiectasias for cosmetic purposes as well.  We discussed cosmetic sclerotherapy with polidocanol foam including risks of allergic reaction, deep vein thrombosis, superficial phlebitis and hyperpigmentation.    PLAN:  Bilateral tibia venous competency studies with video visit to discuss results     Herberth Izquierdo MD    Dictated using Dragon voice recognition software which may result in transcription errors    VEIN CLINIC LEG DRAWING:

## 2023-01-09 NOTE — PATIENT INSTRUCTIONS
Varicose Veins and Spider Veins    Varicose veins are swollen, enlarged veins most often found in the legs. They are usually blue or purple in color and may bulge, twist, and stand out under the skin. Spider veins are small veins just under your skin that can look red, blue or purple.        Normally, veins return blood from the body to the heart. The leg veins have one-way valves that prevent blood from flowing backward in the vein. When the valves are weak or damaged, blood backs up in the veins. This may cause some of the veins to swell and bulge and become varicose veins.     Symptoms  Varicose veins may or may not cause symptoms. If symptoms do occur, they can include:  Legs that feel tired, achy, heavy, or itchy  Leg swelling  Leg muscle cramps  Skin changes, such as discoloration, dryness, redness, or rash (in more severe cases, you may also have sores on the skin called venous leg ulcers)    Risk factors  There are a number of factors that increase the risk for varicose veins. These can include:   Being a woman  Being older  Sitting or standing for long periods  Being overweight  Being pregnant  Having a family history of varicose veins  Hormones, birth control pills    Treatment starts with simple self-help measures (see below). If these don't help, there are many procedures that can be done to shrink or remove varicose veins. Your healthcare provider can tell you more about these options, if needed.     Home care  Support or compression stockings will likely be prescribed. If so, be sure to wear them as directed. They may help improve blood flow.  Exercising helps strengthen your leg muscles and improve blood flow. To get the most benefit, choose exercises such as walking, swimming, or cycling. Also try to exercise for at least 30 minutes on most days.  Raising your legs above heart level will help relieve swelling and keep blood from pooling in veins. Try to elevate your legs for 15 to 20 minutes at  the end of the day, and whenever you're relaxing. To make sure your legs are raised above heart level, prop them up on cushions or large pillows.  To keep blood moving when you have to sit or stand for long periods, try these tips:  At work, take walking breaks instead of coffee breaks. Walk during your lunch hour. Or try flexing your feet up and down 10 times each hour.  When standing, raise yourself up and down on your toes, or rock back and forth on your heels.  If you are overweight, talk with your healthcare provider about setting up a weight-loss plan. Maintaining a healthy weight can help reduce the strain on your veins. It may also improve symptoms, such as swelling and aching.  If you have dryness and itching, ask your provider about special lotions that can be applied to the skin to help improve symptoms.     Follow-up care  Follow up with your healthcare provider, or as directed. If imaging tests were done, you'll be told the results and if there are any new findings that affect your care.     When to seek medical advice  Call your healthcare provider right away if any of these occur:  Sudden, severe leg swelling, pain, or redness  Symptoms worsen, or they don't improve with self-care  Bleeding from any affected veins  Ulcers form on the legs, ankles, or feet  Fever of 100.4 F (38 C) or higher, or as advised by your provider    [employer] last reviewed this educational content on 4/1/2018 2000-2021 [employer]. All rights reserved. This information is not intended as a substitute for professional medical care. Always follow your healthcare professional's instructions.    Self-Care for Spider and Varicose Veins    Your healthcare provider may suggest that you try self-care. Exercising and maintaining a healthy weight may keep problem veins from getting worse. Wearing elastic stockings and elevating your legs can help improve blood flow. Taking breaks when you sit or stand helps,  too.        Wearing compression stockings  Compression stockings gently squeeze veins so blood flows upward. If you need compression stockings, your healthcare provider can prescribe them for you. Follow your healthcare provider's advice about how and when to wear them. Compression stockings come in several different levels of pressure. Ask your healthcare provider which level of pressure would help you the most.     Raising your legs above heart level will help relieve swelling and keep blood from pooling in veins. Try to elevate your legs for 15 to 20 minutes at the end of the day, and whenever you're relaxing. To make sure your legs are raised above heart level, prop them up on cushions or large pillows.    To keep blood moving when you have to sit or stand for long periods, try these tips:  At work, take walking breaks instead of coffee breaks. Walk during your lunch hour. Or try flexing your feet up and down 10 times each hour.  When standing, raise yourself up and down on your toes, or rock back and forth on your heels.    [employer] last reviewed this educational content on 11/1/2019 2000-2021 [employer]. All rights reserved. This information is not intended as a substitute for professional medical care. Always follow your healthcare professional's instructions.    Treatment Options    Sclerotherapy  Your healthcare provider will inject the vein with a special chemical that will quickly close the vein from the inside. This is particularly useful for spider veins and smaller varicose veins.      If you have large varicose veins, surgery may be the best choice. But it will not prevent new varicose veins from forming. Surgery is most often done in a surgery center or in the office.    If surgery is recommended for you, your surgery will be tailored to your needs. Varicose veins may be tied off (ligation), destroyed, or removed. Blood will then flow through the healthy veins. One or more of the  following techniques may be used:    Ablation (laser or radiofrequency)  A tiny cut in the skin is made near the varicose vein. A small tube called a catheter is inserted into the vein. Energy or heat released from the catheter tip will make the vein walls collapse and stick together, stopping all blood flow through the vein.         Ablation (glue)  A tiny cut in the skin is made near the varicose vein. A small tube called a catheter is inserted into the vein. Droplets of glue are deposited into the vein to make vein walls collapse and stick together stopping blood flow through the vein.    Microphlebectomy or ambulatory phlebectomy  A special hook is used to gently take out a varicose vein through tiny incisions. Microphlebectomy may be done in your healthcare provider's office.      Vein stripping and ligation (rare)  In more severe cases, the surgeon may tie off and remove veins by making smaller cuts in the skin. Smaller branching veins may also be tied off or removed.    Know about the risks  Your healthcare provider will talk with you about the risks of surgery. These include:  Bleeding or swelling  A sense of numbness, burning, or tingling in areas near the procedure  Edema or swelling in the legs  Clots in the deep veins that may travel to the lungs  Infection  Scarring  Inflammation related to the glue    [employer] last reviewed this educational content on 11/1/2019 (Sclerotherapy image) 12/1/2019 (Radiofrequency ablation image, Microphlebectomy image)    3046-5198 [employer]. All rights reserved. This information is not intended as a substitute for professional medical care. Always follow your healthcare professional's instructions.

## 2023-01-09 NOTE — LETTER
1/9/2023         RE: Ursula De La Cruz  [address] [place] [address]        Dear Colleague,    Thank you for referring your patient, Ursula De La Cruz, to the [facility] [place]. Please see a copy of my visit note below.    VEINSOLUTIONS CONSULTATION    HPI:    Ursula De La Cruz is a pleasant 32 year old female whom I have seen in the past, last in December 2015.  She had a lower extremity venous competency study in September 2014, the details of which I am not aware as this result paper chart several years ago.  After that visit, she decided on conservative management which she has pursued with exercise, dietary measures, leg elevation and compression hose.    About 6 weeks ago, she had a healthy baby boy, the last baby that she is planning to have.  Her varicose veins were quite painful during her pregnancy and, after her to last pregnancies, have remained painful even after delivering her babies.  She describes pain as a pinching, burning, aching, heaviness and pruritus as well as throbbing after being on her feet for extended periods of time.  The pain is located in her thighs, calfs and feet and is associate with excessive fatigue.    The symptoms interfere with actives of daily living because it makes it hard for her to stand on her feet and her work as a pediatric intensive care unit nurse.  The pain is located in the back of her thighs as well.  She uses ibuprofen and Tylenol on an as-needed basis for her pain.    She has no history of deep vein thrombosis, superficial thrombophlebitis or hemorrhage.    Her family history significant for varicose veins.  There is no family history of venous thromboembolism.    PAST MEDICAL HISTORY:   Past Medical History:   Diagnosis Date     Acne vulgaris      Coronary artery disease     PVCs-asymptomatic. not on any meds for this     Goiter, unspecified 3/17/2015     Hashimoto's thyroiditis 12/21/2016       PAST SURGICAL HISTORY:   Past  Surgical History:   Procedure Laterality Date     BACK SURGERY  2007    L1 compression fracture, no surgery     BREAST SURGERY  2014    elective augmentation     COSMETIC SURGERY      breast augmentation     EP ABLATION PVC N/A 11/26/2019    Procedure: EP ABLATION PVC;  Surgeon: Tony Castro MD;  Location:  [facility]       FAMILY HISTORY:   Family History   Problem Relation Age of Onset     Alcohol/Drug Father      Depression Father      Alcohol/Drug Maternal Grandmother      Kidney Disease Maternal Grandmother      Depression Maternal Grandmother      Lipids Maternal Grandmother      Endocrine Disease Maternal Grandmother      Heart Disease Maternal Grandmother      Coronary Artery Disease Maternal Grandmother      Hypertension Maternal Grandmother      Cerebrovascular Disease Maternal Grandmother      Thyroid Disease Maternal Grandmother      Known Genetic Syndrome Maternal Grandmother         polycystic kidney disease     Alcohol/Drug Maternal Grandfather      Depression Maternal Grandfather      Diabetes Maternal Grandfather      Hyperlipidemia Maternal Grandfather      Alcohol/Drug Paternal Grandfather      Depression Paternal Grandfather      Depression Paternal Grandmother      Kidney Disease Mother      Endocrine Disease Mother      Thyroid Disease Mother      Thyroid Disease Mother         hypothyroidism     Known Genetic Syndrome Mother         polycystic kidney disease     Cancer No family hx of      Glaucoma No family hx of      Macular Degeneration No family hx of        SOCIAL HISTORY:   Social History     Tobacco Use     Smoking status: Never     Smokeless tobacco: Never   Substance Use Topics     Alcohol use: Yes     Comment: social use only       REVIEW OF SYSTEMS: Review Of Systems  Skin: itching  Eyes: negative, glasses  Ears/Nose/Throat: negative  Respiratory: No shortness of breath, dyspnea on exertion, cough, or hemoptysis  Cardiovascular: negative  Gastrointestinal:  negative  Genitourinary: negative  Musculoskeletal: Leg pain, leg swelling  Neurologic: negative  Psychiatric: negative  Hematologic/Lymphatic/Immunologic: negative  Endocrine: Hypothyroidism      Vital signs:  There were no vitals taken for this visit.    Current Outpatient Medications   Medication Sig Dispense Refill     levothyroxine (SYNTHROID/LEVOTHROID) 50 MCG tablet Take 50 mcg by mouth daily       NATURE-THROID 32.5 MG TABS 60 mg         PHYSICAL EXAM:  General: Pleasant, NAD.   HEENT: Normocephalic, atraumatic, external ears and nose normal.   Respiratory: Normal respiratory effort.   Cardiovascular: Pulse is regular.   Musculoskeletal: Gait and station normal.  The joints of her fingers and toes without deformity.  There is no cyanosis of her nailbeds.   EXTREMITIES: Right lower extremity: 4 to 6 mm varicosities coursing from the proximal posteromedial right thigh, down the medial thigh and onto the medial calf.  This extends laterally across the pretibial area onto the anterolateral leg down to the ankle.  Trace edema of the right ankle.  No significant stasis changes.  Clusters of telangiectasias over the anterolateral leg and distal medial thigh.    Left extremity: 4 mm varicosities coursing down the posteromedial left thigh and down to just above the knee.  There is a cluster of telangiectasias in the proximal posterior calf.  3 to 4 mm varicosities over the left medial calf.  Clusters of intense telangiectasias over the distal anterolateral left leg.  Trace edema of the left ankle.    PULSES: R/L (3=normal pulse, 0=no palpable pulse) dorsalis pedis: 3/3; posterior tibial: 3/3.      Neurologic: Grossly normal  Psychiatric: Mood, affect, judgment and insight are normal     ASSESSMENT:  Bilateral lower extremity pain with varicose veins, symptoms recalcitrant to conservative measures and interfering with actives of daily living.  She has been pursuing conservative measures for some 7 years.  These have  slowly worsened.    We discussed the lower extremity vein anatomy, the pathophysiology of venous insufficiency and the option of continued conservative measures with risks of superficial thrombophlebitis, bleeding and progression of the disease process.    If she wishes to investigate this further, we will need bilateral lower extremity venous competency studies.  Based on findings, she may be a candidate for office-based, endovenous ablation of axial incompetence.  We discussed details of endovenous ablation including risks of deep vein thrombosis, bleeding, infection, nerve injury and recurrent varicose veins.  She voiced understanding of our discussion and her questions answered.    She inquired about possibility of treating her spider telangiectasias for cosmetic purposes as well.  We discussed cosmetic sclerotherapy with polidocanol foam including risks of allergic reaction, deep vein thrombosis, superficial phlebitis and hyperpigmentation.    PLAN:  Bilateral tibia venous competency studies with video visit to discuss results     Herberth Izquierdo MD    Dictated using Dragon voice recognition software which may result in transcription errors    [facility] LEG DRAWING:                  Again, thank you for allowing me to participate in the care of your patient.        Sincerely,        Herberth Izquierdo MD

## 2023-01-09 NOTE — NURSING NOTE
Patient Reported symptoms:    Right leg   Heaviness A good bit of the time   Achiness A good bit of the time   Swelling A little of the time   Throbbing A little of the time   Itching A little of the time   Appearance Moderately noticeable   Impact on work/activities Symptoms but full able to participate    Left Leg   Heaviness A good bit of the time   Achiness A good bit of the time   Swelling A little of the time   Throbbing A little of the time   Itching A little of the time   Appearance Moderately noticeable   Impact on work/activities Symptoms but full able to participate

## 2023-01-12 ENCOUNTER — VIRTUAL VISIT (OUTPATIENT)
Dept: VASCULAR SURGERY | Facility: CLINIC | Age: 33
End: 2023-01-12
Attending: SURGERY
Payer: COMMERCIAL

## 2023-01-12 ENCOUNTER — ANCILLARY PROCEDURE (OUTPATIENT)
Dept: ULTRASOUND IMAGING | Facility: CLINIC | Age: 33
End: 2023-01-12
Attending: SURGERY
Payer: COMMERCIAL

## 2023-01-12 DIAGNOSIS — I83.893 SYMPTOMATIC VARICOSE VEINS OF BOTH LOWER EXTREMITIES: Primary | ICD-10-CM

## 2023-01-12 DIAGNOSIS — I83.893 SYMPTOMATIC VARICOSE VEINS OF BOTH LOWER EXTREMITIES: ICD-10-CM

## 2023-01-12 PROCEDURE — 93970 EXTREMITY STUDY: CPT | Performed by: SURGERY

## 2023-01-12 PROCEDURE — 99213 OFFICE O/P EST LOW 20 MIN: CPT | Mod: GT | Performed by: SURGERY

## 2023-01-12 NOTE — PROGRESS NOTES
Ursula is a 32 year old who is being evaluated via a billable video visit.      How would you like to obtain your AVS? MyChart  If the video visit is dropped, the invitation should be resent by: Text to cell phone: [phone]  Will anyone else be joining your video visit? No    Video-Visit Details    Type of service:  Video Visit    Video start time: 4:23 PM    Video end time: 4:42 PM    Originating Location (pt. Location): Home    Distant Location (provider location):  On-site    Platform used for Video Visit: [facility] [place] Progress Note    Ursula De La Cruz presents in follow-up of bilateral lower extremity varicose veins with pain.  Please see my office note of 2023 for details.  She returned on 2022 for bilateral extremity venous competency studies, the results of which we will discuss on today's video visit.    Physical Exam  General: Pleasant female in no acute distress.  Blood pressure 112/69, pulse 63  Extremities:  Right lower extremity: 4 to 6 mm varicosities coursing from the proximal posteromedial right thigh, down the medial thigh and onto the medial calf.  This extends laterally across the pretibial area onto the anterolateral leg down to the ankle.  Trace edema of the right ankle.  No significant stasis changes.  Clusters of telangiectasias over the anterolateral leg and distal medial thigh.     Left extremity: 4 mm varicosities coursing down the posteromedial left thigh and down to just above the knee.  There is a cluster of telangiectasias in the proximal posterior calf.  3 to 4 mm varicosities over the left medial calf.  Clusters of intense telangiectasias over the distal anterolateral left leg.  Trace edema of the left ankle.    Ultrasound:  Name:  Ursula De La Cruz                                                          Patient ID: [id number]  Date: 2023                                                          : 1990  Sex:  female                                                                 Examined by: ANITA Mondragon RVT  Age:  32 year old                                                         Reading MD: MARTINEZ Izquierdo MD     INDICATION:  Bilateral varicose veins with pain     EXAM TYPE  BILATERAL LOWER EXTREMITY VENOUS DUPLEX FOR VENOUS INSUFFICIENCY  TECHNICAL SUMMARY     A duplex ultrasound study using color flow was performed, to evaluate the bilateral lower extremity veins for valvular incompetence with the patient in a steep reversed trendelenberg.      RIGHT:     The deep veins demonstrate phasic flow, compress and respond to augmentations.  There is no reflux or DVT.       The GSV demonstrates phasic flow, compresses and responds to augmentations from the saphenofemoral junction to the ankle with no evidence of  thrombus. The great saphenous vein measures 4.9 mm at the saphenofemoral junction, 3.8 mm at the proximal thigh and 4.4 mm at the knee.  The GSV is incompetent from SFJ to Proximal Thigh and from the knee to mid calf,  with the greatest reflux time of 40198 milliseconds.  The GSV gives rise to a varicose branch measuring 4.0 mm off the  Proximal Calf that courses Medial to communicate with varicose vein off the posterior communicating vein with a reflux time of 07966 milliseconds.       The AASV is incompetent ( 2.4 mm) draining into the saphenofemoral junction. The AASV is incompetent at the saphenofemoal junction with a reflux time of 1782 milliseconds.       The Posterior communicating vein is incompetent (2.2 mm) with a reflux time of 2771 milliseconds. The PCV give rise to an incompetent varicose vein cluster (3.7 mm) coursing medial towards knee and cephalad towards vulvar area with a reflux time of 2775 milliseconds.      The Giacomini vein is competent( 2.7 mm) communicating with the small saphenous vein at the knee level.      The SSV demonstrates phasic flow, compresses and responds to augmentations  from the popliteal space to the ankle.  No reflux or thrombus is seen. The saphenopopliteal junction is competent (2.6 mm).       Perforators:  There is an incompetent  vein ( 2.7 mm) at distal calf  9 cm from medial mallelous that communicates with an incompetent varicose vein.            LEFT:     The deep veins demonstrate phasic flow, compress and respond to augmentations.  There is no reflux or DVT.  The proximal femoral is a bifid system.      The GSV demonstrates phasic flow, compresses and responds to augmentations from the saphenofemoral junction to the ankle with no evidence of reflux or thrombus. The great saphenous vein measures 6.8 mm at the saphenofemoral junction, 3.9 mm at the proximal thigh and 2.2 mm at the knee.       The AASV is competent ( 4.3 mm) draining into the saphenofemoral junction.      The Giacomini vein is competent ( 2.1 mm) communicating with the small saphenous vein at the knee level.      The SSV demonstrates phasic flow, compresses and responds to augmentations at the distal calf to ankle. The SSV is too small to assess competency in the proximal to mid calf but is compressible.   No reflux or thrombus is seen. The saphenopopliteal junction is absent.      Perforators:  There is an incompetent  vein ( 1.5 mm) at distal calf  7 cm from medial mallelous that communicates with an incompetent varicose vein 1.5 mm coursing medial with a reflux time of 676 milliseconds.     A incompetent varicose vein cluster (3.6 mm) is seen arising off a competent  in the posterior proximal thigh coursing towards buttocks with a reflux time of 3134 milliseconds.         FINAL SUMMARY:  1.   No evidence of deep or superficial vein thrombosis in either lower extremity  2.   Right great saphenous vein incompetence  3.  Right posterior communicating vein incompetence, the source of incompetent varicosities  4.  Incompetent right proximal medial thigh pelvic source varicose  veins  5.  Small incompetent  right medial leg  6.  Incompetent varicosity left proximal posterior thigh arising from a posterior thigh  vein            Incompetence Criteria: Greater than 500 milliseconds reflux in the superficial and  veins and greater than 1000 milliseconds reflux in the deep veins.     JUSTYNA Izquierdo MD, FACS     Assessment:  Symptomatic right greater than left lower extremity varicose veins with pain.  She has pursued conservative measures for years but has had the varicosities worsen and become increasingly symptomatic.  Her symptoms interfere with actives daily living because it makes it difficult for her to stand on her feet for long hours and her work as a pediatric intensive care unit nurse.  The pain is located primarily on the right medial thigh but also in the posterior thighs.  Uses Tylenol and ibuprofen on an as-needed basis for the pain.  The pain is persisted despite the use of compression hose.    She is a candidate for endovenous ablation of the right great saphenous vein and the right posterior communicating vein, the source of the varicosities.  The pelvic source varicose veins and the varicosities coursing down the right medial thigh could be treated with ultrasound-guided, medically necessary sclerotherapy.    The right great saphenous vein measures 3.8 mm in diameter in the proximal thigh with reflux time of 3.6 seconds and is the source of vein branches measuring up to 4 mm in diameter with reflux time of 10.5 seconds.    The pelvic source varicose veins measuring 3.2 mm in diameter with reflux time of 2.7 seconds.    In the left lower extremity, she has a cluster of 3.6 mm diameter incompetent vein branches with reflux time of 3.1 seconds which originate from a  on the posterior thigh.  The veins coursed Cephalad toward the buttock.  This is the source of the pain on her left posterior thigh.  These veins can be treated with  medically necessary, ultrasound-guided sclerotherapy at the same setting.    The patient may be a student having cosmetic sclerotherapy for spider telangiectasias.  She understands fully that this will not be covered by insurance will be her responsibility.    Details of treatment including risks of bleeding, infection, nerve injury, deep vein thrombosis, allergic reaction from the polidocanol, hyperpigmentation and superficial phlebitis were all discussed.  The patient voiced understanding and her questions were answered.    Plan:  Radiofrequency ablation of the right great saphenous vein, right posterior communicating vein with medically necessary sclerotherapy of right lower extremity, source varicose veins and left posterior thigh varicose veins.    Spider telangiectasias may be treated for cosmetic purposes with sclerotherapy for which the patient will pay out-of-pocket.    Herberth Izquierdo MD    Dictated using Dragon voice recognition software which may result in transcription errors

## 2023-01-12 NOTE — LETTER
1/12/2023         RE: Ursula De La Cruz  [address] [place] [address]        Dear Colleague,    Thank you for referring your patient, Ursula De La Cruz, to the [facility] [place]. Please see a copy of my visit note below.    Ursula is a 32 year old who is being evaluated via a billable video visit.      How would you like to obtain your AVS? MyChart  If the video visit is dropped, the invitation should be resent by: Text to cell phone: [phone]  Will anyone else be joining your video visit? No    Video-Visit Details    Type of service:  Video Visit    Video start time: 4:23 PM    Video end time: 4:42 PM    Originating Location (pt. Location): Home    Distant Location (provider location):  On-site    Platform used for Video Visit: NuFlick     [facility] [place] Progress Note    Ursula De La Cruz presents in follow-up of bilateral lower extremity varicose veins with pain.  Please see my office note of 1/9/2023 for details.  She returned on 1/12/2022 for bilateral extremity venous competency studies, the results of which we will discuss on today's video visit.    Physical Exam  General: Pleasant female in no acute distress.  Blood pressure 112/69, pulse 63  Extremities:  Right lower extremity: 4 to 6 mm varicosities coursing from the proximal posteromedial right thigh, down the medial thigh and onto the medial calf.  This extends laterally across the pretibial area onto the anterolateral leg down to the ankle.  Trace edema of the right ankle.  No significant stasis changes.  Clusters of telangiectasias over the anterolateral leg and distal medial thigh.     Left extremity: 4 mm varicosities coursing down the posteromedial left thigh and down to just above the knee.  There is a cluster of telangiectasias in the proximal posterior calf.  3 to 4 mm varicosities over the left medial calf.  Clusters of intense telangiectasias over the distal anterolateral left  leg.  Trace edema of the left ankle.    Ultrasound:  Name:  Ursula De La Cruz                                                          Patient ID: [id number]  Date: 2023                                                          : 1990  Sex: female                                                                 Examined by: ANITA Mondragon RVT  Age:  32 year old                                                         Reading MD: MARTINEZ Izquierdo MD     INDICATION:  Bilateral varicose veins with pain     EXAM TYPE  BILATERAL LOWER EXTREMITY VENOUS DUPLEX FOR VENOUS INSUFFICIENCY  TECHNICAL SUMMARY     A duplex ultrasound study using color flow was performed, to evaluate the bilateral lower extremity veins for valvular incompetence with the patient in a steep reversed trendelenberg.      RIGHT:     The deep veins demonstrate phasic flow, compress and respond to augmentations.  There is no reflux or DVT.       The GSV demonstrates phasic flow, compresses and responds to augmentations from the saphenofemoral junction to the ankle with no evidence of  thrombus. The great saphenous vein measures 4.9 mm at the saphenofemoral junction, 3.8 mm at the proximal thigh and 4.4 mm at the knee.  The GSV is incompetent from SFJ to Proximal Thigh and from the knee to mid calf,  with the greatest reflux time of 98922 milliseconds.  The GSV gives rise to a varicose branch measuring 4.0 mm off the  Proximal Calf that courses Medial to communicate with varicose vein off the posterior communicating vein with a reflux time of 49413 milliseconds.       The AASV is incompetent ( 2.4 mm) draining into the saphenofemoral junction. The AASV is incompetent at the saphenofemoal junction with a reflux time of 1782 milliseconds.       The Posterior communicating vein is incompetent (2.2 mm) with a reflux time of 2771 milliseconds. The PCV give rise to an incompetent varicose vein cluster (3.7 mm) coursing medial towards knee and  cephalad towards vulvar area with a reflux time of 2775 milliseconds.      The Giacomini vein is competent( 2.7 mm) communicating with the small saphenous vein at the knee level.      The SSV demonstrates phasic flow, compresses and responds to augmentations from the popliteal space to the ankle.  No reflux or thrombus is seen. The saphenopopliteal junction is competent (2.6 mm).       Perforators:  There is an incompetent  vein ( 2.7 mm) at distal calf  9 cm from medial mallelous that communicates with an incompetent varicose vein.            LEFT:     The deep veins demonstrate phasic flow, compress and respond to augmentations.  There is no reflux or DVT.  The proximal femoral is a bifid system.      The GSV demonstrates phasic flow, compresses and responds to augmentations from the saphenofemoral junction to the ankle with no evidence of reflux or thrombus. The great saphenous vein measures 6.8 mm at the saphenofemoral junction, 3.9 mm at the proximal thigh and 2.2 mm at the knee.       The AASV is competent ( 4.3 mm) draining into the saphenofemoral junction.      The Giacomini vein is competent ( 2.1 mm) communicating with the small saphenous vein at the knee level.      The SSV demonstrates phasic flow, compresses and responds to augmentations at the distal calf to ankle. The SSV is too small to assess competency in the proximal to mid calf but is compressible.   No reflux or thrombus is seen. The saphenopopliteal junction is absent.      Perforators:  There is an incompetent  vein ( 1.5 mm) at distal calf  7 cm from medial mallelous that communicates with an incompetent varicose vein 1.5 mm coursing medial with a reflux time of 676 milliseconds.     A incompetent varicose vein cluster (3.6 mm) is seen arising off a competent  in the posterior proximal thigh coursing towards buttocks with a reflux time of 3134 milliseconds.         FINAL SUMMARY:  1.   No evidence of deep or  superficial vein thrombosis in either lower extremity  2.   Right great saphenous vein incompetence  3.  Right posterior communicating vein incompetence, the source of incompetent varicosities  4.  Incompetent right proximal medial thigh pelvic source varicose veins  5.  Small incompetent  right medial leg  6.  Incompetent varicosity left proximal posterior thigh arising from a posterior thigh  vein            Incompetence Criteria: Greater than 500 milliseconds reflux in the superficial and  veins and greater than 1000 milliseconds reflux in the deep veins.     JUSTYNA Izquierdo MD, FACS     Assessment:  Symptomatic right greater than left lower extremity varicose veins with pain.  She has pursued conservative measures for years but has had the varicosities worsen and become increasingly symptomatic.  Her symptoms interfere with actives daily living because it makes it difficult for her to stand on her feet for long hours and her work as a pediatric intensive care unit nurse.  The pain is located primarily on the right medial thigh but also in the posterior thighs.  Uses Tylenol and ibuprofen on an as-needed basis for the pain.  The pain is persisted despite the use of compression hose.    She is a candidate for endovenous ablation of the right great saphenous vein and the right posterior communicating vein, the source of the varicosities.  The pelvic source varicose veins and the varicosities coursing down the right medial thigh could be treated with ultrasound-guided, medically necessary sclerotherapy.    The right great saphenous vein measures 3.8 mm in diameter in the proximal thigh with reflux time of 3.6 seconds and is the source of vein branches measuring up to 4 mm in diameter with reflux time of 10.5 seconds.    The pelvic source varicose veins measuring 3.2 mm in diameter with reflux time of 2.7 seconds.    In the left lower extremity, she has a cluster of 3.6 mm diameter  incompetent vein branches with reflux time of 3.1 seconds which originate from a  on the posterior thigh.  The veins coursed Cephalad toward the buttock.  This is the source of the pain on her left posterior thigh.  These veins can be treated with medically necessary, ultrasound-guided sclerotherapy at the same setting.    The patient may be a student having cosmetic sclerotherapy for spider telangiectasias.  She understands fully that this will not be covered by insurance will be her responsibility.    Details of treatment including risks of bleeding, infection, nerve injury, deep vein thrombosis, allergic reaction from the polidocanol, hyperpigmentation and superficial phlebitis were all discussed.  The patient voiced understanding and her questions were answered.    Plan:  Radiofrequency ablation of the right great saphenous vein, right posterior communicating vein with medically necessary sclerotherapy of right lower extremity, source varicose veins and left posterior thigh varicose veins.    Spider telangiectasias may be treated for cosmetic purposes with sclerotherapy for which the patient will pay out-of-pocket.    Herberth Izquierdo MD    Dictated using Dragon voice recognition software which may result in transcription errors            Again, thank you for allowing me to participate in the care of your patient.        Sincerely,        Herberth Izquierdo MD

## 2023-01-13 NOTE — PATIENT INSTRUCTIONS
Pre-Procedure Instructions:                                        VNUS Closure  You are having a VNUS Closure. One or more of your veins will be closed with radiofrequency heating.    Insurance  Precertification and/or referral authorization may be required by your insurance company.  We will call your insurance company to verify benefits for the medically necessary part of your procedure.    Your Current Medications and Allergies  Are you on blood thinner medications? (Aspirin, Plavix, Coumadin, Eliquis, Xarelto) Please discuss this with your surgeon.  Are you sensitive to latex or adhesives used for fake fingernails? Please let us know!     Driving Escort and   Please arrange to have a trusted adult (18 years old or older) drive you to and from the clinic.  For your safety, we recommend you have a trusted adult to stay with you until the next morning.    Your Health  If you have a change in your health before the procedure, contact our office immediately.  (For example: cold symptoms, cough, urinary tract infection, fever, flu symptoms).  A pre-procedure physical is not required.    Note  It is sometimes necessary to adjust the procedure schedule due to emergencies. We greatly appreciate your flexibility and understanding in this matter.  Compression hose are needed following this procedure.  __________________________________________________________________________________    Check List:  The Morning of Your Procedure  ___1.  Please do not apply anything on your leg(s) or shave the day of your procedure.  ___2.  You may take your normal medications the day of your procedure.  ___3.  It is recommended you eat a light breakfast or lunch on the day of your procedure.  ___4.  Wear comfortable loose-fitting clothing and wide-fitting shoes (i.e. tennis shoes, slip-ons).  ___5.  Please arrive at our clinic at the specified time given by the nurse.  ___6.  You will sign an affirmation of informed  consent.  ___7.  Bring your pre-procedure sedation medication (lorazepam and clonidine) with you to the clinic. One hour              before your procedure, you will be instructed to take these medications. The lorazepam (Ativan) lowers              anxiety and sedates you; the clonidine makes the lorazepam more effective. Everyone's body processes              these medications differently. Therefore, reactions to these medications vary. Some people stay awake and              some people sleep through the whole procedure. You may not remember everything about the procedure              or the day. You do not want to make any big decisions for the rest of the day.  ___8.  Bring the appropriate compression hose (i.e.thigh high).           The Day of Your Procedure:                  VNUS Closure  In the Exam Room  A nurse will bring you back to an exam room with your family member or friend. This is when your informed consent will be signed, and you will take your pre-procedure medications.  You will be asked to remove everything from the waist down, including undergarments. You will then put on a hospital gown or shorts and blue booties.  Your surgeon will come in to answer any questions and shimon the appropriate leg(s) with a marker.  You will be taken to the restroom to empty your bladder before going into the procedure room.    In the Procedure Room  You will be escorted to the procedure room. You will lie on a procedure table covered with a sheet or blanket.  A nurse will put a blood pressure cuff on your arm and a pulse/oxygen monitor on a finger. Your vital signs will be monitored every 15 minutes.  Your gown will be pulled up slightly and the groin exposed for a short period of time. The surgeon's assistant will clean your foot, leg, and groin with an antibacterial solution. We will get you covered up as quickly as possible!  Sterile towels and blue drapes will be used to cover you and the table. You will be  asked to keep your hands under the blue drapes during the procedure.    The Procedure  The surgeon will visualize your veins with an ultrasound machine. He or she will then numb your skin and access the vein. A catheter is passed up the vein and positioned with ultrasound guidance. The table will then be tipped head down.  Once the catheter is in the correct position, medication will be injected to numb your leg. You will feel some needle sticks and may feel discomfort as the medication goes in. Once this is done, you should not experience significant discomfort. But if you do, please let us know and more numbing medication can be injected. As the catheter sends out heat, the vein closes off and the catheter is withdrawn.        Post-Procedure  Once the procedure is done, your leg will be washed with warm water and dried. You will have one or more small bandages covering your incisions. Your compression hose will be put on or an ACE wrap from your toes to groin. If the ACE wrap feels too tight or binds, please elevate your leg and loosen it.  You will be offered something to drink and a light snack.  You will rest with your leg(s) elevated for approximately 30 minutes. Your friend or family member may join you.   For your safety, you will be accompanied to your car by a staff member.    Post-Procedure Instructions:                          VNUS Closure    Post-Op Day Zero - The Day of Your Procedure:  1. Medication for Pain Control and Inflammation Control   - The numbing medication injected during your procedure will last for several hours. The pre-procedure    tablets may make you very sleepy and you might not remember everything from the procedure or from    the day. This will usually wear off by the next day.   - Ibuprofen:  If tolerated, take ibuprofen (e.g., Advil) to reduce inflammation whether or not you have    pain. For three days, take two tablets (200mg each) with every meal and at bedtime with a snack.  If    you are not able to take Ibuprofen, Tylenol is another option.   - You may resume taking any medications you were taking before your procedure.  2. Activity   - You may be up as tolerated when the sedation wears off. Elevate if possible when not walking.  3. Bandages   - You will have one or more small bandages covering the incision site(s) where we accessed the vein(s).    Keep your bandages on and dry for 48 hours. Compression hose should be worn continuously over    the bandages for the first 24 hours.  4. Incisions   - Bleeding: You may see some incision sites that are oozing through the bandages. This is not unusual    and can be managed with Rest, Ice, Compression and Elevation (RICE). Apply ice and firm pressure    directly to the site that is bleeding and rest with your leg(s) elevated above your heart for 20-30 minutes.    Post-Op Day One:  1. Medication   - Ibuprofen:  Continue the same as the Day of Your Procedure.  2. Activity   - Walk as tolerated. Resume your normal daily walking activities. If it hurts, stop. We encourage you to               walk. Elevate if possible when not walking.  3. Bandages and Compression   - After 24 hours, you may remove your compression hose to take a shower. Please keep your bandage(s)    on and intact. You may want to cover your bandage(s) to ensure it remains dry during your shower.    Reapply your compression hose after your shower and wear during waking hours only.  4. Driving   - You may resume driving when you can do so safely.    Post-Op Day Two:   1. Medication  - Ibuprofen:  Continue the same as the Day of Your Procedure.  2.  Activity   - Walk as tolerated. Elevate if possible when not walking.  3. Bandages and Compression  - You may remove your bandage after 48 hours. Continue wearing your compression hose during waking hours only for a total of seven days following your procedure.  4. Incisions   - Your leg(s) may be bruised at or near incision site(s)  and possibly have numb spots. This is normal.   5. Call Us If:   - You see any areas on your leg that are red and angry in appearance.   - You notice any drainage that is milky or cloudy in appearance or that has a foul odor.   - You run a temperature of 100.5 or greater.      Post-Op Day Three:  You will have a follow up appointment 2-4 days post-procedure. At this appointment, you will have an ultrasound and we will check your incisions.      The Two Weeks Following Your Procedure  1.  Skin Care              - Do not use any lotions, creams, or powders on your incisions for 14 days or until the incisions have healed.              - Do not soak in a bathtub, hot tub or go swimming for 14 days or until your incisions have healed.  2.  Medications   - You may use ibuprofen or acetaminophen (e.g., Tylenol) as needed for pain or discomfort.  3.  Activity   - Do not lift over 25 pounds. After about two weeks you may resume exercise such as aerobics, running,    tennis or weightlifting. Use your common sense and ease back into your exercise routine slowly.   - You may feel a cord-like tightness along the inside of your leg. Gentle stretching can be helpful.  4. Compression Hose   - Your doctor may instruct you to wear compression for longer than seven days; please    follow your doctor's instructions. As a comfort measure, you may choose to wear compression for    longer than required.  5.  Travel   - Do not fly in an airplane for 14 days after your procedure.  If you have a long car trip planned within    two to three weeks following your procedure, stop and walk for a few minutes every two hours.    Periodic ankle pumps during the ride may be helpful.    Six Week Appointment  - At your six-week appointment, you will see your surgeon for an exam and evaluation. This office visit               will be scheduled when you return for post-op day three return appointment.     Return to Work  1.  If you work outside the home,  you may return to work the next day depending on the extent of your    procedure, how you tolerate it, and the type of work you perform.  2.  Paperwork: If your employer requires paperwork or you would like a letter written to your employer, please    let us know. We will complete disability type forms at no charge. Please allow five business days for    forms to be completed.     [employer] last reviewed this educational content on 11/1/2019 2000-2021 [employer]. All rights reserved. This information is not intended as a substitute for professional medical care. Always follow your healthcare professional's instructions.     Sclerotherapy: Pre-Treatment Instructions    Recommended Sessions: 2-4  treatment sessions    Pricing: Full session - $407                 *Payment is due at the time of visit following the treatment    Time Required per Treatment Session - About 45 minutes  Please come in 15 minutes before your scheduled appointment.  30 min.  Sclerotherapy treatments last approximately 30 minutes.  5 min.    A staff member will wipe your legs off with warm water and dry them with a wash cloth.                 Then you can put your compression hose on, get dressed and check out.  10 min.  After your treatment, you will be asked to walk around for 10 minutes before you get in your car.    Medications  Five days before your appointment, discontinue aspirin (Bufferin, Anacin, etc.) and Ibuprofen (Motrin, Advil, Aleve, etc.) to reduce bruising. Resume these medications the day following the treatment.    Leg Preparation  Do not shave your legs or apply any oil, lotion or powder the night before or the day of your treatment.    Clothing  Shorts:  Bring a pair of loose, comfortable shorts to wear during your treatment (or you can choose to wear ours). Shoes: Bring comfortable shoes to accommodate the compression hose after your treatment. Do not wear flip-flops or thong-style sandals unless you have  open-toe compression hose.    Photographs  Photos will be taken before each treatment. This helps monitor your progress.    Injections  The physician will inject your veins with the sclerotherapy solution chosen to meet your specific needs.    Compression Hose  Please bring your compression hose if you have them. They may also be reserved for you at our clinic. Compression hose must be worn immediately after each sclerotherapy treatment.  The hose must be compression level 20-30, and they must be worn for 24 hours straight after your treatment.  If you have never worn compression hose before, a staff member will teach you how to put them on.             You cannot have a treatment without compression hose.               They are critical to the success of your treatment!    You may purchase your compression hose from us. We will measure you and have the hose available when you come for your treatment.    Cancellation and Rescheduling  If you need to cancel or reschedule your sclerotherapy treatment, please give our office at least 24 hour notice.    Sclerotherapy: Basic Information        What is sclerotherapy?  Sclerotherapy is a treatment for  spider  veins.  Spider  veins are small veins just under your skin that can look red, blue or purple. Most  spider  veins are only a cosmetic problem.  Spider  veins are not useful and treating them will not affect your circulation.    How does sclerotherapy work?  1.  Injections: A very small needle is used to inject a solution into the  spider  veins. The solution irritates the cells that line the vein walls. This causes the veins to collapse. The vein walls to stick together and they can no longer carry blood. Different solutions are used based on the size of the veins.  2.  Compression:  The spider veins are kept collapsed by wearing compression stockings. Your body will break down and absorb the treated veins. You wear the compression hose for 24 hours after the  treatment and then for 4 more days during your waking hours only.    How does the body heal after sclerotherapy?  The process is similar to how your body heals after a bad bruise. It takes 4-6 weeks or more for the healing to be complete. When the healing is complete, the vein is no longer visible. It may take more than one treatment.    How do I get the best results?  It is important to follow the post-sclerotherapy instructions. The best results require time and patience. The injection sites will continue to heal and fade for months after a treatment. Please discuss your expectations with your doctor to keep them realistic. Your doctor will do everything possible to meet or exceed your expectations.    How many treatments are needed?  After your initial exam, your doctor will give you an estimate of the number of treatments that may be required. It depends upon the size, type, and quantity of your  spider  veins and on the doctor's assessment, your history and expectations. You may end up needing fewer or more treatments.    How soon can I have another treatment?  Additional treatments are scheduled every 4-6 weeks to allow time for the body to respond to the previous treatment.    Common Side Effects:  Itching  The areas that were injected may itch. This is usually mild and lasts less than a day. Do not use lotions or creams on your legs until the injection sites have healed over.    Pain  It is common to have some tenderness at the injection sites. Injection of the solution can be uncomfortable, but is usually well tolerated by most patients. The tenderness is temporary, lasting 24 hours at most. Tylenol or Ibuprofen can be used, if needed, following the product directions.    Bruising  This may occur at the injections sites. Bruising may be minimized by avoiding Aspirin and Ibuprofen products for five days before each treatment session.    Hyperpigmentation  A light brown discoloration of the skin may develop  along the veins in the areas injected. Approximately 20-30% of patients treated note the discoloration (which is lighter and less obvious than the veins that are being treated). The hyperpigmentation usually fades in a couple of weeks, but may take several months to a year to totally resolve. There is a 1% chance of hyperpigmentation continuing after one year.    Trapped blood  A small amount of blood may become trapped and hardened in the veins. This may feel like a knot or cord and it may look dark blue or bruised. This is a common occurrence. You may need to return before your next treatment so this area can be drained to remove the trapped blood. This will reduce the hyperpigmentation that can occur. The chance of this occurring can be decreased with proper use of compression hose after your treatment.    Matting  Matting is the formation of new, fine  spider  veins in the area injected.  It occurs in approximately 10% of patients injected. The exact reason for this is unknown. If untreated, the matting usually resolves in 3 to 12 months, but very rarely, it can be permanent. If the matting does not fade, it can be re-injected.    Rare Side Effects:  Ulceration at injection sites  Very rarely, a small ulcer will occur at the site where a vein is injected. An ulcer can take 4 to 6 weeks to completely heal. A small scar may result.    Allergic reactions  There is a very rare incidence of an allergic reaction to the solution injected. You will be observed for such reaction and will be treated appropriately should it occur. Please inform us of any allergy history.    Pulmonary embolus/Deep vein thrombosis  This is a blood clot which moves to the lungs/a blood clot in the deep vein system. There is an extraordinarily low incidence of this complication.      SCLEROTHERAPY AFTER CARE  Immediately:  After treatment, walk for 10-15 minutes before getting in your car.  If your trip home is more than 1 hour, stop and walk  around for 5-10 minutes. Avoid sitting or standing for extended periods.   First 24 hours: Wear your compression continuously, even while in bed. After the 24 hours, you may shower if you want to. Put your hose back on, unless you are going to bed. You should NOT wear compression to bed after the first 24 hours. You may fly the next day, but wear your compression.   For 5 days: Wear the compression hose for waking hours only. You may continue to wear them longer than 5 days if you prefer.   For days 5-7: Walking is encouraged, as it promotes efficient circulation in your veins. You may do activities that raise your heart rate, but do NOT run, jog, do high impact aerobics, or weight lifting. After 7 days, no activity restrictions.  Shaving: Wait a few days to shave or apply lotion.   Bathing: Do NOT take hot baths or sit in a hot tub for 7-10 days.    For 1 year: Wear SPF 30 sunscreen on your legs when in the sun. This is very important! It helps prevent darkening of the skin at the injection sites.   Medications: You may resume your usual medications, including aspirin or ibuprofen.    Common Things to Expect       Compression must be worn for the first 24 hours and then during the day for 5-7 days.    If larger veins are treated with ultrasound-guided sclerotherapy, you will have redness, firmness, tenderness, and swelling.  This firmness and tenderness may take 3-6 months to resolve. Ibuprofen and compression hose will aid in this process.    You will have bruising that can last up to 3 weeks. Most fading of the veins will occur between 3 and 6 weeks after treatment.    You may notice brown discoloration (hyperpigmentation) at the treatment site.  This should fade with time, but will take 3 months to 1 year to fully heal.     Some treated veins may look darker because of trapped blood within the vein. This trapped blood can be removed at a minimum of 1 month following treatment. Larger veins are more likely to  develop trapped blood.    It is very important for you to use at least SPF 30 sunscreen in order to help prevent the discoloration of your skin.    Migraines rarely occur following sclerotherapy, but are more likely in patients with a history of migraines.  Treat as you would any other migraine.      [employer] last reviewed this educational content on 11/1/2019 2000-2021 [employer]. All rights reserved. This information is not intended as a substitute for professional medical care. Always follow your healthcare professional's instructions.

## 2023-01-13 NOTE — PROGRESS NOTES
January 13, 2023    Vein Procedure Recommendation    Called and spoke with patient.    Dr. Izquierdo has recommended patient to have the following vein procedure(s):     1. Right leg VNUS closure GSV and ASV and Ultrasound Guided Sclerotherapy (medically necessary)    2. Bilateral leg Sclerotherapy (cosmetic) 2-4 sessions (1st at time of procedure)    Patient Pre-op Questions:  Preferred Pharmacy: [facility] in [place] on [address].  Anticoagulant/ASA: no  Artificial Joint or Heart Valve: no  Open ulcer: no  Sedation nausea: no    Patient is recommended to wear Thigh High compression hose following her procedure. Discussed compression hose. Explained to patient if insurance doesn't cover the compression hose there are a couple different options to getting them and to call the clinic to let us know if they need help.    Entered in patient's After Visit Summary (viewable in [employer]) written procedure instructions to review on her own (see After Visit Summary).      Next steps:    Insurance Submission  Informed patient this process could take up to 14 business days, but once approved, the patient will be contacted by our surgery scheduler to schedule the above procedure. Gave patient our surgery scheduler's information.    Patient is in agreement with all of the above and has no further questions at this time.    Alecia Mackey RN  [facility]  Vein Clinic

## 2023-01-19 DIAGNOSIS — E03.9 HYPOTHYROIDISM: Primary | ICD-10-CM

## 2023-01-23 ENCOUNTER — MEDICAL CORRESPONDENCE (OUTPATIENT)
Dept: HEALTH INFORMATION MANAGEMENT | Facility: CLINIC | Age: 33
End: 2023-01-23

## 2023-01-26 ENCOUNTER — LAB (OUTPATIENT)
Dept: LAB | Facility: CLINIC | Age: 33
End: 2023-01-26
Payer: COMMERCIAL

## 2023-01-26 DIAGNOSIS — E03.9 HYPOTHYROIDISM: ICD-10-CM

## 2023-01-26 LAB — T3FREE SERPL-MCNC: 3.4 PG/ML (ref 2–4.4)

## 2023-01-26 PROCEDURE — 84443 ASSAY THYROID STIM HORMONE: CPT

## 2023-01-26 PROCEDURE — 36415 COLL VENOUS BLD VENIPUNCTURE: CPT

## 2023-01-26 PROCEDURE — 84439 ASSAY OF FREE THYROXINE: CPT

## 2023-01-26 PROCEDURE — 84481 FREE ASSAY (FT-3): CPT

## 2023-01-27 LAB
T4 FREE SERPL-MCNC: 1.12 NG/DL (ref 0.76–1.46)
TSH SERPL DL<=0.005 MIU/L-ACNC: <0.01 MU/L (ref 0.4–4)

## 2023-02-03 ENCOUNTER — VIRTUAL VISIT (OUTPATIENT)
Dept: VASCULAR SURGERY | Facility: CLINIC | Age: 33
End: 2023-02-03
Payer: COMMERCIAL

## 2023-02-03 DIAGNOSIS — I83.893 SYMPTOMATIC VARICOSE VEINS OF BOTH LOWER EXTREMITIES: Primary | ICD-10-CM

## 2023-02-03 PROCEDURE — 99213 OFFICE O/P EST LOW 20 MIN: CPT | Mod: TEL | Performed by: SURGERY

## 2023-02-03 NOTE — LETTER
2/3/2023         RE: Ursula De La Cruz  [address] [place] [address]        Dear Colleague,    Thank you for referring your patient, Ursula De La Cruz, to the [facility] [place]. Please see a copy of my visit note below.    Ursula is a 32 year old who is being evaluated via a billable telephone visit.      What phone number would you like to be contacted at? [phone]  How would you like to obtain your AVS? MyChart    Distant Location (provider location):  On-site      Phone call duration: 10 minutes    [facility] [place] telephone note  Annual Dorothy presented with symptomatic bilateral lower extremity varicose veins and pain.  Please see previous dictations for details.    We had recommended radiofrequency ablation of her right great saphenous and accessory saphenous veins with medically necessary, ultrasound-guided sclerotherapy of varicosities on both lower extremities.  Unfortunately, insurance denied treatment of her right great saphenous and accessory saphenous veins.    In reviewing her ultrasound with her over the phone, the main source of the varicosities of the right lower extremity is from the right posterior accessory saphenous vein and a pelvic source.  These veins coursed from the proximal posteromedial thigh all the way down to her calf.    She also has pelvic source varicose veins on the posterior aspect of her left thigh which are also symptomatic and which we would want to treat with medical necessary sclerotherapy.    Assessment  I feel that we should proceed with ultrasound-guided, medically necessary sclerotherapy of the varicosities on both lower extremities.  I am not able to treat the right posterior accessory saphenous vein with sclerotherapy at the same setting.    Details sclerotherapy including risks of allergic reaction, deep vein thrombosis, superficial phlebitis and hyperpigmentation along the treated veins were discussed.  She  voiced understanding and wishes to proceed.    Plan  Medically necessary sclerotherapy bilateral extremity varicose veins and right posterior sensory saphenous vein    C Maggie Izquierdo MD, FACS    Dictated using Dragon voice recognition software which may result in transcription errors        Again, thank you for allowing me to participate in the care of your patient.        Sincerely,        Herberth Izquierdo MD

## 2023-02-03 NOTE — PROGRESS NOTES
Ursula is a 32 year old who is being evaluated via a billable telephone visit.      What phone number would you like to be contacted at? [phone]  How would you like to obtain your AVS? [email or web address]    Distant Location (provider location):  On-site      Phone call duration: 10 minutes    [facility] [place] telephone note  Coral De La Cruz presented with symptomatic bilateral lower extremity varicose veins and pain.  Please see previous dictations for details.    We had recommended radiofrequency ablation of her right great saphenous and accessory saphenous veins with medically necessary, ultrasound-guided sclerotherapy of varicosities on both lower extremities.  Unfortunately, insurance denied treatment of her right great saphenous and accessory saphenous veins.    In reviewing her ultrasound with her over the phone, the main source of the varicosities of the right lower extremity is from the right posterior accessory saphenous vein and a pelvic source.  These veins coursed from the proximal posteromedial thigh all the way down to her calf.    She also has pelvic source varicose veins on the posterior aspect of her left thigh which are also symptomatic and which we would want to treat with medical necessary sclerotherapy.    Assessment  I feel that we should proceed with ultrasound-guided, medically necessary sclerotherapy of the varicosities on both lower extremities.  I will be able to treat the right posterior accessory saphenous vein with sclerotherapy at the same setting.    Details sclerotherapy including risks of allergic reaction, deep vein thrombosis, superficial phlebitis and hyperpigmentation along the treated veins were discussed.  She voiced understanding and wishes to proceed.    Plan  Medically necessary sclerotherapy bilateral extremity varicose veins and right posterior accesory saphenous vein    C Maggie Izquierdo MD, FACS    Dictated using Dragon voice recognition software which may  result in transcription errors

## 2023-02-03 NOTE — Clinical Note
Go ahead and schedule bilateral lower extremity, medically necessary, ultrasound-guided sclerotherapy, 45-minute session

## 2023-02-06 NOTE — PROGRESS NOTES
February 6, 2023    Vein Procedure Recommendation    Called and spoke with patient. Due to insurance denial  has recommended the following vein procedure(s):     1. Bilateral leg Ultrasound Guided Sclerotherapy (medically necessary) and Sclerotherapy (cosmetic)     Patient is recommended to wear Thigh High compression hose following her procedure. Discussed compression hose. Explained to patient if insurance doesn't cover the compression hose there are a couple different options to getting them and to call the clinic to let us know if they need help.**Patient plans to check with insurance and then get back to clinic.**    Entered in patient's After Visit Summary (viewable in Scribz) written procedure instructions to review on her own (see After Visit Summary).      Patient is in agreement with all of the above and has no further questions at this time.    Alecia Mackey RN  [facility]  Vein Clinic

## 2023-02-06 NOTE — PATIENT INSTRUCTIONS
Sclerotherapy: Pre-Treatment Instructions    Recommended Sessions:  ___2-4___ treatment sessions    Pricing: Full session - $407                 *Payment is due at the time of visit following the treatment    Time Required per Treatment Session - About 45 minutes  Please come in 15 minutes before your scheduled appointment.  30 min.  Sclerotherapy treatments last approximately 30 minutes.  5 min.    A staff member will wipe your legs off with warm water and dry them with a wash cloth.                 Then you can put your compression hose on, get dressed and check out.  10 min.  After your treatment, you will be asked to walk around for 10 minutes before you get in your car.    Medications  Five days before your appointment, discontinue aspirin (Bufferin, Anacin, etc.) and Ibuprofen (Motrin, Advil, Aleve, etc.) to reduce bruising. Resume these medications the day following the treatment.    Leg Preparation  Do not shave your legs or apply any oil, lotion or powder the night before or the day of your treatment.    Clothing  Shorts:  Bring a pair of loose, comfortable shorts to wear during your treatment (or you can choose to wear ours). Shoes: Bring comfortable shoes to accommodate the compression hose after your treatment. Do not wear flip-flops or thong-style sandals unless you have open-toe compression hose.    Photographs  Photos will be taken before each treatment. This helps monitor your progress.    Injections  The physician will inject your veins with the sclerotherapy solution chosen to meet your specific needs.    Compression Hose  Please bring your compression hose if you have them. They may also be reserved for you at our clinic. Compression hose must be worn immediately after each sclerotherapy treatment.  The hose must be compression level 20-30, and they must be worn for 24 hours straight after your treatment.  If you have never worn compression hose before, a staff member will teach you how to put  them on.             You cannot have a treatment without compression hose.               They are critical to the success of your treatment!    You may purchase your compression hose from us. We will measure you and have the hose available when you come for your treatment.    Cancellation and Rescheduling  If you need to cancel or reschedule your sclerotherapy treatment, please give our office at least 24 hour notice.    Sclerotherapy: Basic Information        What is sclerotherapy?  Sclerotherapy is a treatment for  spider  veins.  Spider  veins are small veins just under your skin that can look red, blue or purple. Most  spider  veins are only a cosmetic problem.  Spider  veins are not useful and treating them will not affect your circulation.    How does sclerotherapy work?  1.  Injections: A very small needle is used to inject a solution into the  spider  veins. The solution irritates the cells that line the vein walls. This causes the veins to collapse. The vein walls to stick together and they can no longer carry blood. Different solutions are used based on the size of the veins.  2.  Compression:  The spider veins are kept collapsed by wearing compression stockings. Your body will break down and absorb the treated veins. You wear the compression hose for 24 hours after the treatment and then for 4 more days during your waking hours only.    How does the body heal after sclerotherapy?  The process is similar to how your body heals after a bad bruise. It takes 4-6 weeks or more for the healing to be complete. When the healing is complete, the vein is no longer visible. It may take more than one treatment.    How do I get the best results?  It is important to follow the post-sclerotherapy instructions. The best results require time and patience. The injection sites will continue to heal and fade for months after a treatment. Please discuss your expectations with your doctor to keep them realistic. Your doctor  will do everything possible to meet or exceed your expectations.    How many treatments are needed?  After your initial exam, your doctor will give you an estimate of the number of treatments that may be required. It depends upon the size, type, and quantity of your  spider  veins and on the doctor's assessment, your history and expectations. You may end up needing fewer or more treatments.    How soon can I have another treatment?  Additional treatments are scheduled every 4-6 weeks to allow time for the body to respond to the previous treatment.    Common Side Effects:  Itching  The areas that were injected may itch. This is usually mild and lasts less than a day. Do not use lotions or creams on your legs until the injection sites have healed over.    Pain  It is common to have some tenderness at the injection sites. Injection of the solution can be uncomfortable, but is usually well tolerated by most patients. The tenderness is temporary, lasting 24 hours at most. Tylenol or Ibuprofen can be used, if needed, following the product directions.    Bruising  This may occur at the injections sites. Bruising may be minimized by avoiding Aspirin and Ibuprofen products for five days before each treatment session.    Hyperpigmentation  A light brown discoloration of the skin may develop along the veins in the areas injected. Approximately 20-30% of patients treated note the discoloration (which is lighter and less obvious than the veins that are being treated). The hyperpigmentation usually fades in a couple of weeks, but may take several months to a year to totally resolve. There is a 1% chance of hyperpigmentation continuing after one year.    Trapped blood  A small amount of blood may become trapped and hardened in the veins. This may feel like a knot or cord and it may look dark blue or bruised. This is a common occurrence. You may need to return before your next treatment so this area can be drained to remove the  trapped blood. This will reduce the hyperpigmentation that can occur. The chance of this occurring can be decreased with proper use of compression hose after your treatment.    Matting  Matting is the formation of new, fine  spider  veins in the area injected.  It occurs in approximately 10% of patients injected. The exact reason for this is unknown. If untreated, the matting usually resolves in 3 to 12 months, but very rarely, it can be permanent. If the matting does not fade, it can be re-injected.    Rare Side Effects:  Ulceration at injection sites  Very rarely, a small ulcer will occur at the site where a vein is injected. An ulcer can take 4 to 6 weeks to completely heal. A small scar may result.    Allergic reactions  There is a very rare incidence of an allergic reaction to the solution injected. You will be observed for such reaction and will be treated appropriately should it occur. Please inform us of any allergy history.    Pulmonary embolus/Deep vein thrombosis  This is a blood clot which moves to the lungs/a blood clot in the deep vein system. There is an extraordinarily low incidence of this complication.      SCLEROTHERAPY AFTER CARE  Immediately:  After treatment, walk for 10-15 minutes before getting in your car.  If your trip home is more than 1 hour, stop and walk around for 5-10 minutes. Avoid sitting or standing for extended periods.   First 24 hours: Wear your compression continuously, even while in bed. After the 24 hours, you may shower if you want to. Put your hose back on, unless you are going to bed. You should NOT wear compression to bed after the first 24 hours. You may fly the next day, but wear your compression.   For 5 days: Wear the compression hose for waking hours only. You may continue to wear them longer than 5 days if you prefer.   For days 5-7: Walking is encouraged, as it promotes efficient circulation in your veins. You may do activities that raise your heart rate, but do  NOT run, jog, do high impact aerobics, or weight lifting. After 7 days, no activity restrictions.  Shaving: Wait a few days to shave or apply lotion.   Bathing: Do NOT take hot baths or sit in a hot tub for 7-10 days.    For 1 year: Wear SPF 30 sunscreen on your legs when in the sun. This is very important! It helps prevent darkening of the skin at the injection sites.   Medications: You may resume your usual medications, including aspirin or ibuprofen.    Common Things to Expect       Compression must be worn for the first 24 hours and then during the day for 5-7 days.    If larger veins are treated with ultrasound-guided sclerotherapy, you will have redness, firmness, tenderness, and swelling.  This firmness and tenderness may take 3-6 months to resolve. Ibuprofen and compression hose will aid in this process.    You will have bruising that can last up to 3 weeks. Most fading of the veins will occur between 3 and 6 weeks after treatment.    You may notice brown discoloration (hyperpigmentation) at the treatment site.  This should fade with time, but will take 3 months to 1 year to fully heal.     Some treated veins may look darker because of trapped blood within the vein. This trapped blood can be removed at a minimum of 1 month following treatment. Larger veins are more likely to develop trapped blood.    It is very important for you to use at least SPF 30 sunscreen in order to help prevent the discoloration of your skin.    Migraines rarely occur following sclerotherapy, but are more likely in patients with a history of migraines.  Treat as you would any other migraine.      Scali last reviewed this educational content on 11/1/2019 2000-2021 [employer]. All rights reserved. This information is not intended as a substitute for professional medical care. Always follow your healthcare professional's instructions.

## 2023-03-16 ENCOUNTER — OFFICE VISIT (OUTPATIENT)
Dept: VASCULAR SURGERY | Facility: CLINIC | Age: 33
End: 2023-03-16
Payer: COMMERCIAL

## 2023-03-16 DIAGNOSIS — I83.811 VARICOSE VEINS OF RIGHT LOWER EXTREMITY WITH PAIN: ICD-10-CM

## 2023-03-16 DIAGNOSIS — I83.812 VARICOSE VEINS OF LEFT LOWER EXTREMITY WITH PAIN: ICD-10-CM

## 2023-03-16 DIAGNOSIS — I83.893 SYMPTOMATIC VARICOSE VEINS OF BOTH LOWER EXTREMITIES: ICD-10-CM

## 2023-03-16 PROCEDURE — S9999 SALES TAX: HCPCS | Performed by: SURGERY

## 2023-03-16 PROCEDURE — 76942 ECHO GUIDE FOR BIOPSY: CPT | Performed by: SURGERY

## 2023-03-16 PROCEDURE — 36471 NJX SCLRSNT MLT INCMPTNT VN: CPT | Mod: 50 | Performed by: SURGERY

## 2023-03-16 PROCEDURE — 36468 NJX SCLRSNT SPIDER VEINS: CPT | Performed by: SURGERY

## 2023-03-16 NOTE — PATIENT INSTRUCTIONS
Progress Note Details  Patient Name: Barron Alonzo  Patient Number: [id number]  Patient YOB: 1950  Date: 3/16/2020  Physician / Brenton Sloan: Anyi Zavala 19: Bryant Dance    Chief Complaint  This information was obtained from the pa SCLEROTHERAPY AFTER CARE  Immediately:  After treatment, walk for 10-15 minutes before getting in your car.  If your trip home is more than 1 hour, stop and walk around for 5-10 minutes. Avoid sitting or standing for extended periods.   First 24 hours: Wear your compression continuously, even while in bed. After the 24 hours, you may shower if you want to. Put your hose back on, unless you are going to bed. You should NOT wear compression to bed after the first 24 hours. You may fly the next day, but wear your compression.   For 5 days: Wear the compression hose for waking hours only. You may continue to wear them longer than 5 days if you prefer.   For days 5-7: Walking is encouraged, as it promotes efficient circulation in your veins. You may do activities that raise your heart rate, but do NOT run, jog, do high impact aerobics, or weight lifting. After 7 days, no activity restrictions.    Shaving: Wait a few days to shave or apply lotion.   Bathing: Do NOT take hot baths or sit in a hot tub for 7-10 days.    For 1 year: Wear SPF 30 sunscreen on your legs when in the sun. This is very important! It helps prevent darkening of the skin at the injection sites.   Medications: You may resume your usual medications, including aspirin or ibuprofen.    Common Things to Expect  -  Compression must be worn for the first 24 hours and then during the day for 5-7 days.    -  If larger veins are treated with ultrasound-guided sclerotherapy, you will have redness, firmness, tenderness, and swelling.  This firmness and tenderness may take 3-6 months to resolve. Ibuprofen and compression hose will aid in this process.    -  You will have bruising that can last up to 3 weeks. Most fading of the veins will occur between 3 and 6 weeks after treatment.    -  You may notice brown discoloration (hyperpigmentation) at the treatment site.  This should fade with time, but will take 3 months to 1 year to fully heal.     -  Some treated  has 1-25% epithelialization, 51-75% slough, 1-25% pink, spongy granulation. The periwound skin exhibited: Edema, Dry/Scaly, Erythema. The periwound skin did not exhibit: Moist, Maceration. The temperature of the periwound skin is Warm.  Periwound skin does veins may look darker because of trapped blood within the vein. This trapped blood can be removed at a minimum of 1 month following treatment. Larger veins are more likely to develop trapped blood.    -  It is very important for you to use at least SPF 30 sunscreen in order to help prevent the discoloration of your skin.    -  Migraines rarely occur following sclerotherapy, but are more likely in patients with a history of migraines.  Treat as you would any other migraine.    cm;        Plan    Wound Orders:  Wound #2 Right Foot    Topicals:  No anesthetic needed. Insensate. Wound Cleansing & Dressings  May shower with protection.   Silver alginate  ABD pad  Other: - Kendall  Change Dressing Daily and PRN    Compression Therapy

## 2023-03-16 NOTE — LETTER
3/16/2023         RE: Ursula De La Cruz  [address] [place] [address]        Dear Colleague,    Thank you for referring your patient, Ursula De La Cruz, to the [facility] [place]. Please see a copy of my visit note below.        Vein Clinic Sclerotherapy Note     Indications:  1.  Right leg varicose veins with pain; symptoms recalcitrant to conservative measures  2.  Bilateral lower extremity spider telangiectasias of cosmetic concern     Procedure:  1.  Medically necessary, ultrasound-guided sclerotherapy right posterior communicating vein  2.  Medically necessary, ultrasound-guided sclerotherapy right lower extremity pelvic source varicose veins  3.  Medically necessary,  Guided sclerotherapy of right distal medial thigh and medial calf varicose veins  4.  Bilateral lower extremity cosmetic sclerotherapy     Procedure description  Details of procedure including risks of allergic reaction, deep vein thrombosis, ulceration, superficial thrombophlebitis and hyperpigmentation were discussed.  The patient voiced understanding and wished to proceed.  Informed consent was obtained.    Medically necessary sclerotherapy  I imaged the proximal medial right thigh and noted the large varicosities coursing down the thigh originating from pelvic source and also from the right posterior communicating vein.  These are isolated in the proximal thigh and I injected the posterior communicating vein using 1% polidocanol foam under ultrasound guidance.    I then imaged the varicosities coursing down the thigh from just cephalad of the posterior communicating vein from pelvic sources, and directed a 27-gauge needle into them under ultrasound guidance and injected 1% polidocanol foam.    I then moved down the medial thigh injecting at the mid thigh, the distal thigh and again on the proximal medial calf under ultrasound guidance.  I used a total of 2 syringes of 1% polidocanol foam for the right  leg.    I then had the patient turned prone and injected a proximal posterior thigh pelvic source varicose vein that was in the proximal posterior medial thigh near the buttock crease.  I then followed this down the posterior thigh and injected this vein in several locations using a total of 2 syringes of foam.    Cosmetic sclerotherapy  I then donned the Syris headlight and injected scattered telangiectasias and reticular veins over the legs from the ankles to the upper thighs circumferentially using a total of 4 syringes (12 mL) of 0.5% polidocanol foam.  I had the patient perform ankle pumps.    We cleaned her legs, had her don compression, then had her walk for 10 minutes.  She will return in approximately 6 weeks in follow-up likely for distal medically necessary ultrasound-guided sclerotherapy and cosmetic sclerotherapy.    Sclerotherapy    Date/Time: 3/20/2023 2:03 PM  Performed by: Herberth Izquierdo MD  Authorized by: Herberth Izquierdo MD     Time out: Immediately prior to the procedure a time out was called    Type:  Medically Necessary  Vein:  Multiple Veins  Yes    Procedure side:  Right  Solution/Amount:  1% POLIDOCANOL  Syringes:  2 syringes (6 mL 1% polidocanol foam)  Patient tolerance:  Patient tolerated the procedure well with no immediate complications  Wrap/Hose:  Hose  Sclerotherapy    Date/Time: 3/20/2023 2:04 PM  Performed by: Herberth Izquierdo MD  Authorized by: Herberth Izquierdo MD     Type:  Medically Necessary  Vein:  Multiple Veins  Yes    Procedure side:  Left  Solution/Amount:  1% POLIDOCANOL  Syringes:  2 syringes (6 mL 1% polidocanol foam)  Sclerotherapy    Date/Time: 3/20/2023 2:04 PM  Performed by: Herberth Izquierdo MD  Authorized by: Herberth Izquierdo MD     Type:  Cosmetic  Session:  Full  Procedure side:  Bilateral  Solution/Amount:  .5 POLIDOCANOL  Syringes:  4 syringes (12 mL 0.5% polidocanol foam  Patient tolerance:  Patient  tolerated the procedure well with no immediate complications  Wrap/Hose:  Massimo Izquierdo MD    Dictated using Dragon voice recognition software which may result in transcription errors        Again, thank you for allowing me to participate in the care of your patient.        Sincerely,        Herberth Izquierdo MD

## 2023-03-20 NOTE — PROGRESS NOTES
Vein Clinic Sclerotherapy Note     Indications:  1.  Right leg varicose veins with pain; symptoms recalcitrant to conservative measures  2.  Bilateral lower extremity spider telangiectasias of cosmetic concern     Procedure:  1.  Medically necessary, ultrasound-guided sclerotherapy right posterior communicating vein  2.  Medically necessary, ultrasound-guided sclerotherapy right lower extremity pelvic source varicose veins  3.  Medically necessary,  Guided sclerotherapy of right distal medial thigh and medial calf varicose veins  4.  Bilateral lower extremity cosmetic sclerotherapy  5.  Medically necessary, ultrasound-guided sclerotherapy multiple, left  posterior thigh pelvic source varicose veins     Procedure description  Details of procedure including risks of allergic reaction, deep vein thrombosis, ulceration, superficial thrombophlebitis and hyperpigmentation were discussed.  The patient voiced understanding and wished to proceed.  Informed consent was obtained.    Medically necessary sclerotherapy  I imaged the proximal medial right thigh and noted the large varicosities coursing down the thigh originating from pelvic source and also from the right posterior communicating vein.  These are isolated in the proximal thigh and I injected the posterior communicating vein using 1% polidocanol foam under ultrasound guidance.    I then imaged the varicosities coursing down the thigh from just cephalad of the posterior communicating vein from pelvic sources, and directed a 27-gauge needle into them under ultrasound guidance and injected 1% polidocanol foam.    I then moved down the medial thigh injecting at the mid thigh, the distal thigh and again on the proximal medial calf under ultrasound guidance.  I used a total of 2 syringes of 1% polidocanol foam for the right leg.    I then had the patient turned prone and injected a proximal posterior left thigh pelvic source varicose vein that was in the proximal  posterior medial thigh near the buttock crease.  I then followed this down the posterior thigh and injected this vein in several locations using a total of 2 syringes of foam.    Cosmetic sclerotherapy  I then donned the Syris headlight and injected scattered telangiectasias and reticular veins over the legs from the ankles to the upper thighs circumferentially using a total of 4 syringes (12 mL) of 0.5% polidocanol foam.  I had the patient perform ankle pumps.    We cleaned her legs, had her don compression, then had her walk for 10 minutes.  She will return in approximately 6 weeks in follow-up likely for distal medically necessary ultrasound-guided sclerotherapy and cosmetic sclerotherapy.    Sclerotherapy    Date/Time: 3/20/2023 2:03 PM  Performed by: Herberth Izquierdo MD  Authorized by: Herberth Izquierdo MD     Time out: Immediately prior to the procedure a time out was called    Type:  Medically Necessary  Vein:  Multiple Veins  Yes    Procedure side:  Right  Solution/Amount:  1% POLIDOCANOL  Syringes:  2 syringes (6 mL 1% polidocanol foam)  Patient tolerance:  Patient tolerated the procedure well with no immediate complications  Wrap/Hose:  Hose  Sclerotherapy    Date/Time: 3/20/2023 2:04 PM  Performed by: Herberth Izquierdo MD  Authorized by: Herberth Izquierdo MD     Type:  Medically Necessary  Vein:  Multiple Veins  Yes    Procedure side:  Left  Solution/Amount:  1% POLIDOCANOL  Syringes:  2 syringes (6 mL 1% polidocanol foam)  Sclerotherapy    Date/Time: 3/20/2023 2:04 PM  Performed by: Herberth Izquierdo MD  Authorized by: Herberth Izquierdo MD     Type:  Cosmetic  Session:  Full  Procedure side:  Bilateral  Solution/Amount:  .5 POLIDOCANOL  Syringes:  4 syringes (12 mL 0.5% polidocanol foam  Patient tolerance:  Patient tolerated the procedure well with no immediate complications  Wrap/Hose:  Hose        JUSTYNA Izquierdo MD    Dictated using Velma  voice recognition software which may result in transcription errors

## 2023-04-22 ENCOUNTER — HEALTH MAINTENANCE LETTER (OUTPATIENT)
Age: 33
End: 2023-04-22

## 2023-04-27 ENCOUNTER — OFFICE VISIT (OUTPATIENT)
Dept: VASCULAR SURGERY | Facility: CLINIC | Age: 33
End: 2023-04-27
Payer: COMMERCIAL

## 2023-04-27 DIAGNOSIS — I78.1 SPIDER TELANGIECTASIS OF SKIN: ICD-10-CM

## 2023-04-27 DIAGNOSIS — I83.811 VARICOSE VEINS OF RIGHT LOWER EXTREMITY WITH PAIN: ICD-10-CM

## 2023-04-27 PROCEDURE — 76942 ECHO GUIDE FOR BIOPSY: CPT | Performed by: SURGERY

## 2023-04-27 PROCEDURE — S9999 SALES TAX: HCPCS | Performed by: SURGERY

## 2023-04-27 PROCEDURE — 36471 NJX SCLRSNT MLT INCMPTNT VN: CPT | Mod: RT | Performed by: SURGERY

## 2023-04-27 PROCEDURE — 36468 NJX SCLRSNT SPIDER VEINS: CPT | Performed by: SURGERY

## 2023-04-27 NOTE — PROGRESS NOTES
Vein Clinic Sclerotherapy Note     Indications:  1.  Residual, symptomatic right medial thigh and right proximal calf varicose veins; status post 1 session medically necessary ultrasound-guided sclerotherapy  2.  Residual, bilateral extremity spider telangiectasias of cosmetic concern status post 1 session cosmetic sclerotherapy     Procedure:  1.  Medically necessary, ultrasound-guided sclerotherapy right distal medial thigh and proximal medial calf varicose veins  2.  Bilateral lower extremity cosmetic sclerotherapy     Procedure description  Details of procedure including risks of allergic reaction, deep vein thrombosis, ulceration, superficial thrombophlebitis and hyperpigmentation were discussed.  The patient voiced understanding and wished to proceed.  Informed consent was obtained.    Medically necessary sclerotherapy  I imaged the right medial thigh and noted that the majority of the large varicosities along the medial thigh were closed.  In close interrogation I identified sizable, compressible varicosities more posteriorly on the distal third of the thigh which communicated with a  at the mid thigh level.  I isolated this with ultrasound and directed a 27-gauge needle into 2 different locations of these veins filling them with 1% polidocanol foam.    I then imaged in the proximal medial right leg injecting 1% polidocanol foam under outside guidance into the varicosities at this location.  I then imaged about 6 cm below the knee and injected 1% polidocanol foam into the vein branch which coursed distally and somewhat laterally.    There was trapped blood in a varicosity in the proximal posterior medial right thigh and on the distal posterior medial right thigh which we cleaned with alcohol, made stab wounds an 18-gauge needle and expressed thrombus.    Cosmetic sclerotherapy  I donned the Syris headlight and injected telangiectasias on the proximal lateral left leg, and the left popliteal  fossa and extending down the posterior left calf and a few scattered over the right anteromedial leg and distal anterior leg using 0.5% polidocanol foam.  There were a few scattered areas of her thighs that we also injected.  I had her perform ankle pumps.    I cleaned an area of trapped blood in the previously injected telangiectasias on the lateral left leg with alcohol, made stab wounds with an 18-gauge needle and expressed thrombus.  We cleaned her legs, had her don compression, then had her walk for 10 minutes.    She may return in 6 weeks to check for trapped blood.  I would not plan ultrasound interrogation at that time.    Sclerotherapy    Date/Time: 4/27/2023 4:13 PM    Performed by: Herberth Izquierdo MD  Authorized by: Herberth Izquierdo MD    Time out: Immediately prior to the procedure a time out was called    Type:  Medically Necessary  Vein:  Multiple Veins  Yes    Procedure side:  Right  Solution/Amount:  1% POLIDOCANOL  Syringes:  1 syringe (3 mL)  Evacuation of intraluminal thrombus under sterile conditions without complications.    Patient tolerance:  Patient tolerated the procedure well with no immediate complications  Wrap/Hose:  Hose  Sclerotherapy    Date/Time: 4/27/2023 4:13 PM    Performed by: Herberth Izquierdo MD  Authorized by: Herberth Izquierdo MD    Type:  Cosmetic  Session:  Full  Procedure side:  Bilateral  Solution/Amount:  .5 POLIDOCANOL  Syringes:  3  Evacuation of intraluminal thrombus under sterile conditions without complications.    Patient tolerance:  Patient tolerated the procedure well with no immediate complications  Wrap/Hose:  Massimo Izquierdo MD    Dictated using Dragon voice recognition software which may result in transcription errors

## 2023-10-16 NOTE — DISCHARGE INSTRUCTIONS
Care of groin site:         Remove the Band-Aid after 24 hours. If there is minor oozing, apply another Band-aid and remove it after 12 hours.          Do NOT take a bath, or use a hot tub or pool for at least 3 days. You may shower.          It is normal to have a small bruise or lump at the site.         Do not scrub the site.         Do not use lotion or powder near the puncture site for 3 days.         For the first 2 days: Do not stoop or squat. When you cough, sneeze or move your bowels, hold your hand over the puncture site and press gently.         Do not lift more than 10 pounds for at least 3 to 5 days.         For 2 days, do NOT have sex or do any heavy exercise.     If you start bleeding from the site in your groin:  Lie down flat and press firmly on the site.  Call your physician immediately, or, come to the emergency room.     Nasal Turnover Hinge Flap Text: Due to the deep nature of the defect, and to restore structure and function, and to cover and ensure survival of underlying tissue, and to provide cutaneous coverage, a cutaneous hinge flap was performed.  Three sides of the full-thickness skin adjacent to the defect were incised and flipped over like a page of the book into the defect, and secured with Vicryl stitches.

## 2024-05-07 ENCOUNTER — TELEPHONE (OUTPATIENT)
Dept: NEUROSURGERY | Facility: CLINIC | Age: 34
End: 2024-05-07
Payer: COMMERCIAL

## 2024-05-07 DIAGNOSIS — I67.1 ANEURYSM OF CAVERNOUS PORTION OF LEFT INTERNAL CAROTID ARTERY: Primary | ICD-10-CM

## 2024-05-07 NOTE — TELEPHONE ENCOUNTER
[facility] Call Center    Phone Message    May a detailed message be left on voicemail: yes     Reason for Call: Pt said that she is way overdue for follow up with Dr. Crawford.  She said that we were supposed to call her to schedule a 5 year follow up and never did.  She said that she will need imaging done prior to seeing him.  She is requesting that the imaging order be placed and call her back to schedule.  Thanks.

## 2024-05-07 NOTE — TELEPHONE ENCOUNTER
MRA Brain without contrast Order Entered to compare with MRA in 2015  for 2.6 mm left internal carotid artery aneurysm     Please schedule 5 year F/U first available spot.  No urgency

## 2024-05-24 NOTE — TELEPHONE ENCOUNTER
RECORDS RECEIVED FROM: Internal    REASON FOR VISIT: Cerebral aneurysm, nonruptured/ LOV 2015/ MRA prior.   PROVIDER: Daron Crawford MD   DATE OF APPT: 7/30/24 @ 8:30 am    NOTES (FOR ALL VISITS) STATUS DETAILS   OFFICE NOTE from referring provider Internal 5/7/24 (Phone Note), 10/6/15 Daron Crawford MD @[facility]-NeuroSurg     MEDICATION LIST Internal    IMAGING  (FOR ALL VISITS)     MRI (HEAD, NECK, SPINE) Internal  [facility]  6/12/24 MRA Brain ([facility])  9/18/15 MRA Head

## 2024-06-12 ENCOUNTER — ANCILLARY PROCEDURE (OUTPATIENT)
Dept: MRI IMAGING | Facility: CLINIC | Age: 34
End: 2024-06-12
Attending: NEUROLOGICAL SURGERY
Payer: COMMERCIAL

## 2024-06-12 DIAGNOSIS — I67.1 ANEURYSM OF CAVERNOUS PORTION OF LEFT INTERNAL CAROTID ARTERY: ICD-10-CM

## 2024-06-12 PROCEDURE — 70544 MR ANGIOGRAPHY HEAD W/O DYE: CPT | Mod: TC | Performed by: RADIOLOGY

## 2024-07-30 ENCOUNTER — PRE VISIT (OUTPATIENT)
Dept: NEUROSURGERY | Facility: CLINIC | Age: 34
End: 2024-07-30

## 2024-07-30 ENCOUNTER — VIRTUAL VISIT (OUTPATIENT)
Dept: NEUROSURGERY | Facility: CLINIC | Age: 34
End: 2024-07-30
Payer: COMMERCIAL

## 2024-07-30 DIAGNOSIS — I67.1 ANEURYSM OF CAVERNOUS PORTION OF LEFT INTERNAL CAROTID ARTERY: Primary | ICD-10-CM

## 2024-07-30 DIAGNOSIS — Z82.71 FAMILY HISTORY OF POLYCYSTIC KIDNEY DISEASE: ICD-10-CM

## 2024-07-30 PROCEDURE — 99442 PR PHYSICIAN TELEPHONE EVALUATION 11-20 MIN: CPT | Performed by: NEUROLOGICAL SURGERY

## 2024-07-30 NOTE — PATIENT INSTRUCTIONS
Follow up with Dr Crawford in 5 years with MRA Brain ([place] [facility] [place]) prior.    Order entered    Call Leena NUR  Neurosurgery Care Coordinator with questions/concerns     Thank you for using [facility]

## 2024-07-30 NOTE — PROGRESS NOTES
Virtual Visit Details  Date of service: 7/30/2024  Type of service:  Telephone Visit   Phone call duration: 15 minutes   Originating Location (pt. Location): Home    Distant Location (provider location):  On-site    Tayo Lora MD  [address] [place] [address]      Dear Dr. Lora:    We spoke to Mrs. De La Cruz as part of a telephone follow-up in cerebrovascular clinic today.  We have not spoken to her in many years.  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    She feels well and had no complaints.  Her health has been good over the past decade.  She is  and has 3 children.  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  All of her mother's siblings have been diagnosed with polycystic kidney disease.  She does not carry the diagnosis as of now.    Over the phone, she sounded well.  Her speech, language, and phonation were normal.    We reviewed her MRA from 6/12/2024 and compared it to her last study from 2015.  There is been no change in the 2 mm left cavernous segment internal carotid artery aneurysm.  No other aneurysms are seen.    Based on her clinical and radiographic stability, we will repeat an MRA in 5 years.  We have no activity restrictions for her.  As before, we stressed the difference between an extradural and intradural aneurysm.  With this extradural aneurysm, she is at no risk for aneurysmal subarachnoid hemorrhage.  Please not hesitate contact us with questions.    Sincerely,        Daron Crawford MD  Department of Neurosurgery

## 2024-07-30 NOTE — LETTER
7/30/2024       RE: Ursula De La Cruz  [address] [facility] [address]     Dear Colleague,    Thank you for referring your patient, Ursula De La Cruz, to the [facility] [place] [facility]. Please see a copy of my visit note below.    Tayo Lora MD  [address] [facility] [place] [address]      Dear Dr. Lora:    We spoke to Mrs. De La Cruz as part of a telephone follow-up in cerebrovascular clinic today.  We have not spoken to her in many years.  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    She feels well and had no complaints.  Her health has been good over the past decade.  She is  and has 3 children.  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  All of her mother's siblings have been diagnosed with polycystic kidney disease.  She does not carry the diagnosis as of now.    Over the phone, she sounded well.  Her speech, language, and phonation were normal.    We reviewed her MRA from 6/12/2024 and compared it to her last study from 2015.  There is been no change in the 2 mm left cavernous segment internal carotid artery aneurysm.  No other aneurysms are seen.    Based on her clinical and radiographic stability, we will repeat an MRA in 5 years.  We have no activity restrictions for her.  As before, we stressed the difference between an extradural and intradural aneurysm.  With this extradural aneurysm, she is at no risk for aneurysmal subarachnoid hemorrhage.  Please not hesitate contact us with questions.            Again, thank you for allowing me to participate in the care of your patient.      Sincerely,    Daron Crawford MD

## 2024-07-30 NOTE — NURSING NOTE
Current patient location:  work    Is the patient currently in the state [facility]? YES    Visit mode:VIDEO    If the visit is dropped, the patient can be reconnected by: TELEPHONE VISIT: Phone number:   Telephone Information:   Mobile [phone]       Will anyone else be joining the visit? NO  (If patient encounters technical issues they should call [phone] :[phone])    How would you like to obtain your AVS? MyChart    Are changes needed to the allergy or medication list? Pt stated no med changes    Are refills needed on medications prescribed by this physician? NO    Reason for visit: Telephone (Cerebral aneurysm, non ruptured )    Georgiana DENISE

## 2024-09-07 ENCOUNTER — HEALTH MAINTENANCE LETTER (OUTPATIENT)
Age: 34
End: 2024-09-07

## 2025-05-06 ENCOUNTER — LAB (OUTPATIENT)
Dept: LAB | Facility: CLINIC | Age: 35
End: 2025-05-06
Attending: EMERGENCY MEDICINE
Payer: COMMERCIAL

## 2025-05-06 ENCOUNTER — VIRTUAL VISIT (OUTPATIENT)
Dept: URGENT CARE | Facility: CLINIC | Age: 35
End: 2025-05-06
Payer: COMMERCIAL

## 2025-05-06 DIAGNOSIS — J02.9 PHARYNGITIS, UNSPECIFIED ETIOLOGY: ICD-10-CM

## 2025-05-06 DIAGNOSIS — J02.9 PHARYNGITIS, UNSPECIFIED ETIOLOGY: Primary | ICD-10-CM

## 2025-05-06 LAB
DEPRECATED S PYO AG THROAT QL EIA: NEGATIVE
S PYO DNA THROAT QL NAA+PROBE: NOT DETECTED

## 2025-05-06 PROCEDURE — 98005 SYNCH AUDIO-VIDEO EST LOW 20: CPT

## 2025-05-06 PROCEDURE — 87651 STREP A DNA AMP PROBE: CPT

## 2025-05-06 NOTE — PROGRESS NOTES
Video visit:  Start time: 12:29 PM  Stop time: 12:37 PM  Duration: 8 minutes  Patient location: At home  Provider location: [facility] virtual provider (remote).  Platform used for video visit: Jose Guadalupe        CHIEF COMPLAINT: Throat discomfort.  Strep exposure.      HPI: Patient is a 34-year-old female whose had a 3-day history of sensation of swelling in her throat.  Both of her children are being treated for strep.  She does not feel like she has pain in her throat per se and does not have pain with swallowing.  She can fluids adequately.      ROS: See HPI otherwise normal.    No Known Allergies   Current Outpatient Medications   Medication Sig Dispense Refill    levothyroxine (SYNTHROID/LEVOTHROID) 50 MCG tablet Take 50 mcg by mouth daily      NATURE-THROID 32.5 MG TABS 60 mg           PE: No acute distress.  No dysphonia.  Nondyspneic appearing speaking in full sentences.        TREATMENT: None.      ASSESSMENT: Throat discomfort in the face of strep exposure.  Discussed empiric treatment with antibiotics versus testing.  Patient opts for testing with follow-up based on those results.      DIAGNOSIS: Pharyngitis.  Strep exposure.      PLAN: Lab only appointment for rapid strep testing arranged.  Will track results.  Tylenol Advil, crushed popsicles for throat discomfort.  Recheck 4 to 5 days if symptoms continue, sooner if worse

## 2025-05-27 ENCOUNTER — ANCILLARY PROCEDURE (OUTPATIENT)
Dept: GENERAL RADIOLOGY | Facility: CLINIC | Age: 35
End: 2025-05-27
Attending: PHYSICIAN ASSISTANT
Payer: COMMERCIAL

## 2025-05-27 ENCOUNTER — OFFICE VISIT (OUTPATIENT)
Dept: FAMILY MEDICINE | Facility: CLINIC | Age: 35
End: 2025-05-27
Payer: COMMERCIAL

## 2025-05-27 VITALS
TEMPERATURE: 98.2 F | SYSTOLIC BLOOD PRESSURE: 129 MMHG | WEIGHT: 164.6 LBS | OXYGEN SATURATION: 100 % | HEART RATE: 81 BPM | BODY MASS INDEX: 27.42 KG/M2 | HEIGHT: 65 IN | RESPIRATION RATE: 16 BRPM | DIASTOLIC BLOOD PRESSURE: 73 MMHG

## 2025-05-27 DIAGNOSIS — E06.3 HASHIMOTO'S THYROIDITIS: ICD-10-CM

## 2025-05-27 DIAGNOSIS — G47.19 EXCESSIVE DAYTIME SLEEPINESS: ICD-10-CM

## 2025-05-27 DIAGNOSIS — M25.551 HIP PAIN, RIGHT: ICD-10-CM

## 2025-05-27 DIAGNOSIS — R53.83 OTHER FATIGUE: Primary | ICD-10-CM

## 2025-05-27 LAB
BASOPHILS # BLD AUTO: 0 10E3/UL (ref 0–0.2)
BASOPHILS NFR BLD AUTO: 0 %
EOSINOPHIL # BLD AUTO: 0.2 10E3/UL (ref 0–0.7)
EOSINOPHIL NFR BLD AUTO: 3 %
ERYTHROCYTE [DISTWIDTH] IN BLOOD BY AUTOMATED COUNT: 11.7 % (ref 10–15)
EST. AVERAGE GLUCOSE BLD GHB EST-MCNC: 100 MG/DL
HBA1C MFR BLD: 5.1 % (ref 0–5.6)
HCT VFR BLD AUTO: 39.6 % (ref 35–47)
HGB BLD-MCNC: 13.6 G/DL (ref 11.7–15.7)
IMM GRANULOCYTES # BLD: 0 10E3/UL
IMM GRANULOCYTES NFR BLD: 0 %
LYMPHOCYTES # BLD AUTO: 2.3 10E3/UL (ref 0.8–5.3)
LYMPHOCYTES NFR BLD AUTO: 39 %
MCH RBC QN AUTO: 30.8 PG (ref 26.5–33)
MCHC RBC AUTO-ENTMCNC: 34.3 G/DL (ref 31.5–36.5)
MCV RBC AUTO: 90 FL (ref 78–100)
MONOCYTES # BLD AUTO: 0.3 10E3/UL (ref 0–1.3)
MONOCYTES NFR BLD AUTO: 5 %
NEUTROPHILS # BLD AUTO: 3.1 10E3/UL (ref 1.6–8.3)
NEUTROPHILS NFR BLD AUTO: 53 %
PLATELET # BLD AUTO: 220 10E3/UL (ref 150–450)
RBC # BLD AUTO: 4.41 10E6/UL (ref 3.8–5.2)
WBC # BLD AUTO: 5.9 10E3/UL (ref 4–11)

## 2025-05-27 PROCEDURE — 80053 COMPREHEN METABOLIC PANEL: CPT | Performed by: PHYSICIAN ASSISTANT

## 2025-05-27 PROCEDURE — 73502 X-RAY EXAM HIP UNI 2-3 VIEWS: CPT | Mod: TC | Performed by: RADIOLOGY

## 2025-05-27 PROCEDURE — 1125F AMNT PAIN NOTED PAIN PRSNT: CPT | Performed by: PHYSICIAN ASSISTANT

## 2025-05-27 PROCEDURE — 83036 HEMOGLOBIN GLYCOSYLATED A1C: CPT | Performed by: PHYSICIAN ASSISTANT

## 2025-05-27 PROCEDURE — 99214 OFFICE O/P EST MOD 30 MIN: CPT | Performed by: PHYSICIAN ASSISTANT

## 2025-05-27 PROCEDURE — 36415 COLL VENOUS BLD VENIPUNCTURE: CPT | Performed by: PHYSICIAN ASSISTANT

## 2025-05-27 PROCEDURE — 86038 ANTINUCLEAR ANTIBODIES: CPT | Performed by: PHYSICIAN ASSISTANT

## 2025-05-27 PROCEDURE — 84443 ASSAY THYROID STIM HORMONE: CPT | Performed by: PHYSICIAN ASSISTANT

## 2025-05-27 PROCEDURE — 3074F SYST BP LT 130 MM HG: CPT | Performed by: PHYSICIAN ASSISTANT

## 2025-05-27 PROCEDURE — 86618 LYME DISEASE ANTIBODY: CPT | Performed by: PHYSICIAN ASSISTANT

## 2025-05-27 PROCEDURE — 3078F DIAST BP <80 MM HG: CPT | Performed by: PHYSICIAN ASSISTANT

## 2025-05-27 PROCEDURE — 85025 COMPLETE CBC W/AUTO DIFF WBC: CPT | Performed by: PHYSICIAN ASSISTANT

## 2025-05-27 PROCEDURE — 3044F HG A1C LEVEL LT 7.0%: CPT | Performed by: PHYSICIAN ASSISTANT

## 2025-05-27 PROCEDURE — 86431 RHEUMATOID FACTOR QUANT: CPT | Performed by: PHYSICIAN ASSISTANT

## 2025-05-27 RX ORDER — LEVOTHYROXINE, LIOTHYRONINE 38; 9 UG/1; UG/1
60 TABLET ORAL DAILY
COMMUNITY
Start: 2025-05-16

## 2025-05-27 ASSESSMENT — PAIN SCALES - GENERAL: PAINLEVEL_OUTOF10: MILD PAIN (1)

## 2025-05-27 ASSESSMENT — ENCOUNTER SYMPTOMS
HEADACHES: 1
FATIGUE: 1
HIP PAIN: 1

## 2025-05-27 NOTE — PROGRESS NOTES
Assessment & Plan     (R53.83) Other fatigue  (primary encounter diagnosis)  Comment: Fatigue/daytime sleepiness.  Discussed sleep hygiene.  Possibility of frequent wakening contributing to daytime fatigue.  Discussed with patient screening labs as well as autoimmune markers for rheumatologic causes.  Continue to follow with [facility] for thyroid.  Exact etiology not entirely clear.  Plan: Anti Nuclear Oksana IgG by IFA with Reflex,         Rheumatoid factor, CBC with platelets and         differential, Comprehensive metabolic panel         (BMP + Alb, Alk Phos, ALT, AST, Total. Bili,         TP), Anti Nuclear Oksana IgG by IFA with Reflex,         LYME DISEASE TOTAL ANTIBODIES WITH REFLEX TO         CONFIRMATION, Hemoglobin A1c          (M25.551) Hip pain, right  Comment: Right hip pain.  Discussed with patient possibility of iliopsoas versus arthritis issues.  Discussed obtaining x-ray.  No obvious bony abnormality on independent review.  Plan: XR Pelvis and Hip Right 1 View, Rheumatoid         factor, Anti Nuclear Oksana IgG by IFA with         Reflex, Physical Therapy  Referral,         Orthopedic  Referral          (E06.3) Hashimoto's thyroiditis  Comment: History of Hashimoto's.  Continue with [facility]  Plan: TSH with free T4 reflex          (G47.19) Excessive daytime sleepiness  Comment: Excessive daytime sleepiness.  Discussed with patient sleep referral  Plan: Adult Sleep Eval & Management          Referral             Omid Vergara is a 34 year old, presenting for the following health issues:  Fatigue, Headache, Chills, and Hip Pain (Right hip pain. )      5/27/2025    10:52 AM   Additional Questions   Roomed by ANGELLA ROCHA   Accompanied by KATIUSKA     Fatigue  Associated symptoms include fatigue and headaches.   Headache     Hip Pain  Associated symptoms include fatigue and headaches.   History of Present Illness       Reason for visit:  Fatigue and right hip pain  Symptom onset:  3-4  "weeks ago  Symptoms include:  Fatigue and hip pain  Symptom intensity:  Moderate  Symptom progression:  Staying the same  Had these symptoms before:  No   She is taking medications regularly.      Over last 2-1/2 weeks of right hip pain indicates of the anterior aspect.  No trauma, injury.  Denies any new activity.  Describes more tightness with activity.  This is not a constant pain.  Does have regular exercise.    Further, patient reports ongoing fatigue issues.  History of hypothyroidism has been following with [facility] for management.  Reports frequent awakening and overall poor sleep.   Patient concerned of potential rheumatologic causes of ongoing fatigue issues.       Review of Systems  Constitutional, HEENT, cardiovascular, pulmonary, gi and gu systems are negative, except as otherwise noted.      Objective    /73   Pulse 81   Temp 98.2  F (36.8  C) (Tympanic)   Resp 16   Ht 1.651 m (5' 5\")   Wt 74.7 kg (164 lb 9.6 oz)   SpO2 100%   BMI 27.39 kg/m    Body mass index is 27.39 kg/m .  Physical Exam   GENERAL: alert and no distress  RESP: lungs clear to auscultation - no rales, rhonchi or wheezes  CV: regular rates and rhythm, no murmur, click or rub, peripheral pulses strong, and no peripheral edema  ABDOMEN: soft, nontender, no hepatosplenomegaly, no masses and bowel sounds normal  MS: no gross musculoskeletal defects noted, no edema  NEURO: Normal strength and tone, mentation intact and speech normal  PSYCH: mentation appears normal, affect normal/bright            Signed Electronically by: Devonte Samuel PA-C    "

## 2025-05-28 ENCOUNTER — RESULTS FOLLOW-UP (OUTPATIENT)
Dept: FAMILY MEDICINE | Facility: CLINIC | Age: 35
End: 2025-05-28

## 2025-05-28 ENCOUNTER — PATIENT OUTREACH (OUTPATIENT)
Dept: CARE COORDINATION | Facility: CLINIC | Age: 35
End: 2025-05-28
Payer: COMMERCIAL

## 2025-05-28 LAB
ALBUMIN SERPL BCG-MCNC: 4.7 G/DL (ref 3.5–5.2)
ALP SERPL-CCNC: 59 U/L (ref 40–150)
ALT SERPL W P-5'-P-CCNC: 13 U/L (ref 0–50)
ANA SER QL IF: NEGATIVE
ANION GAP SERPL CALCULATED.3IONS-SCNC: 12 MMOL/L (ref 7–15)
AST SERPL W P-5'-P-CCNC: 17 U/L (ref 0–45)
B BURGDOR IGG+IGM SER QL: 0.18
BILIRUB SERPL-MCNC: 0.5 MG/DL
BUN SERPL-MCNC: 11.2 MG/DL (ref 6–20)
CALCIUM SERPL-MCNC: 9.5 MG/DL (ref 8.8–10.4)
CHLORIDE SERPL-SCNC: 102 MMOL/L (ref 98–107)
CREAT SERPL-MCNC: 0.66 MG/DL (ref 0.51–0.95)
EGFRCR SERPLBLD CKD-EPI 2021: >90 ML/MIN/1.73M2
GLUCOSE SERPL-MCNC: 99 MG/DL (ref 70–99)
HCO3 SERPL-SCNC: 26 MMOL/L (ref 22–29)
POTASSIUM SERPL-SCNC: 4.1 MMOL/L (ref 3.4–5.3)
PROT SERPL-MCNC: 7.9 G/DL (ref 6.4–8.3)
RHEUMATOID FACT SERPL-ACNC: <10 IU/ML
SODIUM SERPL-SCNC: 140 MMOL/L (ref 135–145)
TSH SERPL DL<=0.005 MIU/L-ACNC: 1.35 UIU/ML (ref 0.3–4.2)

## 2025-06-01 ENCOUNTER — HEALTH MAINTENANCE LETTER (OUTPATIENT)
Age: 35
End: 2025-06-01

## (undated) DEVICE — PACK HEART LEFT CUSTOM

## (undated) DEVICE — CATH EP 7FR X 115CM DECANAV CA

## (undated) DEVICE — CATH EP SUPREME 6FRX120CM 5MM CRD-2 CURVE 402004

## (undated) DEVICE — INTRODUCER SHEATH FAST-CATH 9FRX12CM 406116

## (undated) DEVICE — TUBE SET SMARKABLATE IRRIGATION

## (undated) DEVICE — PATCH CARTO 3 EXTERNAL REFERENCE 3D MAPPING CREFP6

## (undated) DEVICE — PAD DEFIBRILLATOR ELECTRODE 4.25INX6IN ADULT 2001M-C

## (undated) DEVICE — INTRO SHEATH 4FRX10CM PINNACLE RSS402

## (undated) DEVICE — INTRODUCER SHEATH FAST-CATH CATH-LOCK 8FRX12CM 406706

## (undated) DEVICE — INTRO CATH 12CM 8.5FR FST-CATH

## (undated) DEVICE — INTRO SHEATH MICRO PLATINUM TIP 4FRX40CM 7274

## (undated) RX ORDER — HEPARIN SODIUM 1000 [USP'U]/ML
INJECTION, SOLUTION INTRAVENOUS; SUBCUTANEOUS
Status: DISPENSED
Start: 2019-11-26

## (undated) RX ORDER — FENTANYL CITRATE 50 UG/ML
INJECTION, SOLUTION INTRAMUSCULAR; INTRAVENOUS
Status: DISPENSED
Start: 2019-11-26

## (undated) RX ORDER — ONDANSETRON 2 MG/ML
INJECTION INTRAMUSCULAR; INTRAVENOUS
Status: DISPENSED
Start: 2019-11-26

## (undated) RX ORDER — SODIUM CHLORIDE 9 MG/ML
INJECTION, SOLUTION INTRAVENOUS
Status: DISPENSED
Start: 2019-11-26

## (undated) RX ORDER — DIPHENHYDRAMINE HYDROCHLORIDE 50 MG/ML
INJECTION INTRAMUSCULAR; INTRAVENOUS
Status: DISPENSED
Start: 2019-11-26

## (undated) RX ORDER — METOPROLOL TARTRATE 1 MG/ML
INJECTION, SOLUTION INTRAVENOUS
Status: DISPENSED
Start: 2019-11-26

## (undated) RX ORDER — LIDOCAINE HYDROCHLORIDE 10 MG/ML
INJECTION, SOLUTION EPIDURAL; INFILTRATION; INTRACAUDAL; PERINEURAL
Status: DISPENSED
Start: 2019-11-26